# Patient Record
Sex: MALE | Race: OTHER | Employment: FULL TIME | URBAN - METROPOLITAN AREA
[De-identification: names, ages, dates, MRNs, and addresses within clinical notes are randomized per-mention and may not be internally consistent; named-entity substitution may affect disease eponyms.]

---

## 2018-03-19 ENCOUNTER — HOSPITAL ENCOUNTER (OUTPATIENT)
Age: 43
Discharge: HOME | End: 2018-03-19
Attending: FAMILY MEDICINE
Payer: COMMERCIAL

## 2018-03-19 VITALS
DIASTOLIC BLOOD PRESSURE: 98 MMHG | RESPIRATION RATE: 16 BRPM | TEMPERATURE: 98 F | HEART RATE: 62 BPM | SYSTOLIC BLOOD PRESSURE: 130 MMHG | OXYGEN SATURATION: 98 %

## 2018-03-19 DIAGNOSIS — J02.9 ACUTE VIRAL PHARYNGITIS: Primary | ICD-10-CM

## 2018-03-19 LAB — S PYO AG THROAT QL: NEGATIVE

## 2018-03-19 PROCEDURE — 87430 STREP A AG IA: CPT

## 2018-03-19 PROCEDURE — 99202 OFFICE O/P NEW SF 15 MIN: CPT

## 2018-03-19 PROCEDURE — 99203 OFFICE O/P NEW LOW 30 MIN: CPT

## 2018-03-19 RX ORDER — IBUPROFEN 800 MG/1
800 TABLET ORAL EVERY 8 HOURS PRN
Qty: 15 TABLET | Refills: 0 | Status: SHIPPED | OUTPATIENT
Start: 2018-03-19 | End: 2018-03-24

## 2020-03-16 ENCOUNTER — OFFICE VISIT (OUTPATIENT)
Dept: INTERNAL MEDICINE CLINIC | Facility: CLINIC | Age: 45
End: 2020-03-16
Payer: COMMERCIAL

## 2020-03-16 VITALS
BODY MASS INDEX: 30.99 KG/M2 | DIASTOLIC BLOOD PRESSURE: 105 MMHG | HEART RATE: 70 BPM | TEMPERATURE: 97 F | SYSTOLIC BLOOD PRESSURE: 160 MMHG | WEIGHT: 186 LBS | HEIGHT: 65 IN

## 2020-03-16 DIAGNOSIS — M62.838 MUSCLE SPASM OF LEFT SHOULDER: ICD-10-CM

## 2020-03-16 DIAGNOSIS — M25.512 ACUTE PAIN OF LEFT SHOULDER: Primary | ICD-10-CM

## 2020-03-16 PROCEDURE — 99214 OFFICE O/P EST MOD 30 MIN: CPT | Performed by: INTERNAL MEDICINE

## 2020-03-16 RX ORDER — IBUPROFEN 200 MG
200 TABLET ORAL EVERY 6 HOURS PRN
COMMUNITY

## 2020-03-16 RX ORDER — CYCLOBENZAPRINE HCL 5 MG
TABLET ORAL
Qty: 30 TABLET | Refills: 0 | Status: SHIPPED | OUTPATIENT
Start: 2020-03-16

## 2020-03-16 RX ORDER — METHYLPREDNISOLONE 4 MG/1
TABLET ORAL
Qty: 1 PACKAGE | Refills: 0 | Status: SHIPPED | OUTPATIENT
Start: 2020-03-16

## 2020-03-16 NOTE — PATIENT INSTRUCTIONS
Back Spasm (No Trauma)    Spasm of the back muscles can occur after a sudden forceful twisting or bending such as in a car accident. A spasm can also happen after a simple awkward movement, or after lifting something heavy with poor body positioning.  In · You can start with ice, then switch to heat. Heat from a hot shower, hot bath, or heating pad reduces pain and works well for muscle spasms. Put heat on the painful area for 20 minutes, then remove it for 20 minutes.  Do this over a period of 60 to 90 min If X-rays were taken, they may be reviewed by a radiologist. Parminder Flannery will be told of any new findings that may affect your care.   Call   Call if any of these occur:  · Trouble breathing  · Confusion  · Drowsiness or trouble awakening  · Fainting or loss of con

## 2020-03-16 NOTE — PROGRESS NOTES
History of Present Illness   Patient ID: Iliana Church is a 38year old male. Chief Complaint: Shoulder Pain (Left )      Shoulder Pain    The pain is present in the left shoulder (left anterior). This is a new problem.  Episode onset: last monday- no Abdominal: Soft. Bowel sounds are normal. Musculoskeletal:      Left shoulder: He exhibits decreased range of motion (above arm), tenderness (mild- anterior), pain and spasm.  He exhibits no bony tenderness, no swelling, no effusion, no crepitus, no deformi • cyclobenzaprine 5 MG Oral Tab Take 2.5mg - 5mg nightly as needed for muscle spasms. May cause sedation; no driving. 30 tablet 0            Assessment & Plan    1. Acute pain of left shoulder  - methylPREDNISolone 4 MG Oral Tablet Therapy Pack;  Take as Michelle Storm You don't always need X-rays for the first assessment of back pain, unless you had a physical injury such as from a car accident or fall.  If your pain continues and doesn't respond to medical treatment, X-rays and other tests may then be done.   Home care · Relax and repeat the exercise with your right knee. · Do 2 to 3 of these exercises for each leg. · Repeat, hugging both knees to your chest at the same time. · Don't bounce, but use a gentle pull.   Medicines  Talk with your doctor before using medicin © 2306-6237 [address]. [address] [facility], [facility] [employer]. All rights reserved. This information is not intended as a substitute for professional medical care. Always follow your healthcare professional's instructions.

## 2020-11-04 ENCOUNTER — TELEMEDICINE (OUTPATIENT)
Dept: INTERNAL MEDICINE CLINIC | Facility: CLINIC | Age: 45
End: 2020-11-04
Payer: COMMERCIAL

## 2020-11-04 DIAGNOSIS — R05.9 COUGH: ICD-10-CM

## 2020-11-04 DIAGNOSIS — Z20.822 SUSPECTED COVID-19 VIRUS INFECTION: Primary | ICD-10-CM

## 2020-11-04 PROCEDURE — 99213 OFFICE O/P EST LOW 20 MIN: CPT | Performed by: INTERNAL MEDICINE

## 2020-11-04 RX ORDER — CODEINE PHOSPHATE AND GUAIFENESIN 10; 100 MG/5ML; MG/5ML
5 SOLUTION ORAL EVERY 6 HOURS PRN
Qty: 240 ML | Refills: 0 | Status: SHIPPED | OUTPATIENT
Start: 2020-11-04

## 2020-11-04 NOTE — PROGRESS NOTES
Telehealth outside of [address] Verbal Consent     I conducted a telehealth visit with Lopez Peace today, 11/04/20, which was completed using two-way, real-time interactive audio and video communication.  This has been done in good arlen to headaches and a sore throat. Pertinent negatives include no abdominal pain, chest pain or diarrhea. He has tried sleep for the symptoms. The treatment provided no relief. No recent sick contacts but last week daughter felt flu like ssx for 2-3 days.    He symptoms of hypoxia pneumonia discussed advised to go to the ER if these occur. Isolate any when he comes in contact with no that he is feeling symptoms.   Cheratussin given as above to help reduce cough and help him sleep, side effects of opiate-based med as needed for Pain. • methylPREDNISolone 4 MG Oral Tablet Therapy Pack Take as directed. 1 Package 0   • cyclobenzaprine 5 MG Oral Tab Take 2.5mg - 5mg nightly as needed for muscle spasms. May cause sedation; no driving.  30 tablet 0            Patient

## 2020-11-05 ENCOUNTER — LAB ENCOUNTER (OUTPATIENT)
Dept: LAB | Age: 45
End: 2020-11-05
Attending: INTERNAL MEDICINE
Payer: COMMERCIAL

## 2020-11-05 DIAGNOSIS — Z20.822 SUSPECTED COVID-19 VIRUS INFECTION: ICD-10-CM

## 2020-11-16 ENCOUNTER — TELEMEDICINE (OUTPATIENT)
Dept: INTERNAL MEDICINE CLINIC | Facility: CLINIC | Age: 45
End: 2020-11-16
Payer: COMMERCIAL

## 2020-11-16 DIAGNOSIS — J98.8 RESPIRATORY TRACT INFECTION DUE TO COVID-19 VIRUS: Primary | ICD-10-CM

## 2020-11-16 DIAGNOSIS — U07.1 RESPIRATORY TRACT INFECTION DUE TO COVID-19 VIRUS: Primary | ICD-10-CM

## 2020-11-16 PROBLEM — Z20.822 SUSPECTED COVID-19 VIRUS INFECTION: Status: RESOLVED | Noted: 2020-11-04 | Resolved: 2020-11-16

## 2020-11-16 PROCEDURE — 99213 OFFICE O/P EST LOW 20 MIN: CPT | Performed by: INTERNAL MEDICINE

## 2020-11-16 NOTE — PROGRESS NOTES
Telehealth outside of [address] Verbal Consent     I conducted a telehealth visit with Toña Bowens today, 11/16/20, which was completed using two-way, real-time interactive audio and video communication.  This has been done in good arlen to There has been no fever. Associated symptoms include coughing (rare, dry) and rhinorrhea. Pertinent negatives include no abdominal pain, chest pain, congestion, diarrhea, headaches or wheezing. He has tried nothing for the symptoms.  The treatment provided worsening let me know and we will reevaluate. Duration of visit in minutes: 9  Follow Up: As needed. Labs & Imaging  Pertinent labs and imaging reviewed.    No results found for: GLU, BUN, BUNCREA, CREATSERUM, ANIONGAP, GFR, GFRNAA, GFRAA, CA, OS follow directions and advice. Adelina Carranza MD  Internal Medicine    ----------------------------------------- PATIENT INSTRUCTIONS-----------------------------------------     There are no Patient Instructions on file for this visit.

## 2023-02-18 ENCOUNTER — HOSPITAL ENCOUNTER (OUTPATIENT)
Dept: CT IMAGING | Facility: HOSPITAL | Age: 48
Discharge: HOME | End: 2023-02-18
Attending: INTERNAL MEDICINE
Payer: COMMERCIAL

## 2023-02-18 ENCOUNTER — LAB ENCOUNTER (OUTPATIENT)
Dept: LAB | Age: 48
End: 2023-02-18
Attending: INTERNAL MEDICINE
Payer: COMMERCIAL

## 2023-02-18 ENCOUNTER — TELEPHONE (OUTPATIENT)
Dept: INTERNAL MEDICINE CLINIC | Facility: CLINIC | Age: 48
End: 2023-02-18

## 2023-02-18 ENCOUNTER — OFFICE VISIT (OUTPATIENT)
Dept: INTERNAL MEDICINE CLINIC | Facility: CLINIC | Age: 48
End: 2023-02-18

## 2023-02-18 VITALS
DIASTOLIC BLOOD PRESSURE: 74 MMHG | BODY MASS INDEX: 31.95 KG/M2 | OXYGEN SATURATION: 97 % | TEMPERATURE: 98 F | WEIGHT: 187.13 LBS | HEIGHT: 64 IN | SYSTOLIC BLOOD PRESSURE: 126 MMHG | HEART RATE: 71 BPM

## 2023-02-18 DIAGNOSIS — R79.89 ELEVATED LFTS: Primary | ICD-10-CM

## 2023-02-18 DIAGNOSIS — R10.12 LEFT UPPER QUADRANT ABDOMINAL PAIN: Primary | ICD-10-CM

## 2023-02-18 DIAGNOSIS — R10.12 LEFT UPPER QUADRANT ABDOMINAL PAIN: ICD-10-CM

## 2023-02-18 LAB
ALBUMIN SERPL-MCNC: 3.8 G/DL (ref 3.4–5)
ALBUMIN/GLOB SERPL: 1 {RATIO} (ref 1–2)
ALP LIVER SERPL-CCNC: 98 U/L
ALT SERPL-CCNC: 61 U/L
ANION GAP SERPL CALC-SCNC: 3 MMOL/L (ref 0–18)
APPEARANCE: YELLOW
AST SERPL-CCNC: 41 U/L (ref 15–37)
BASOPHILS # BLD AUTO: 0.03 X10(3) UL (ref 0–0.2)
BASOPHILS NFR BLD AUTO: 0.5 %
BILIRUB SERPL-MCNC: 0.4 MG/DL (ref 0.1–2)
BILIRUBIN: NEGATIVE
BUN BLD-MCNC: 12 MG/DL (ref 7–18)
BUN/CREAT SERPL: 10.5 (ref 10–20)
CALCIUM BLD-MCNC: 8.9 MG/DL (ref 8.5–10.1)
CHLORIDE SERPL-SCNC: 110 MMOL/L (ref 98–112)
CO2 SERPL-SCNC: 31 MMOL/L (ref 21–32)
CREAT BLD-MCNC: 1.14 MG/DL
DEPRECATED RDW RBC AUTO: 37.2 FL (ref 35.1–46.3)
EOSINOPHIL # BLD AUTO: 0.13 X10(3) UL (ref 0–0.7)
EOSINOPHIL NFR BLD AUTO: 2 %
ERYTHROCYTE [DISTWIDTH] IN BLOOD BY AUTOMATED COUNT: 12.2 % (ref 11–15)
FASTING STATUS PATIENT QL REPORTED: YES
GFR SERPLBLD BASED ON 1.73 SQ M-ARVRAT: 80 ML/MIN/1.73M2 (ref 60–?)
GLOBULIN PLAS-MCNC: 3.9 G/DL (ref 2.8–4.4)
GLUCOSE (URINE DIPSTICK): NEGATIVE MG/DL
GLUCOSE BLD-MCNC: 94 MG/DL (ref 70–99)
HCT VFR BLD AUTO: 42 %
HGB BLD-MCNC: 14 G/DL
IMM GRANULOCYTES # BLD AUTO: 0.01 X10(3) UL (ref 0–1)
IMM GRANULOCYTES NFR BLD: 0.2 %
KETONES (URINE DIPSTICK): NEGATIVE MG/DL
LEUKOCYTES: NEGATIVE
LIPASE SERPL-CCNC: 145 U/L (ref 73–393)
LIPASE SERPL-CCNC: 32 U/L (ref 13–75)
LYMPHOCYTES # BLD AUTO: 2.65 X10(3) UL (ref 1–4)
LYMPHOCYTES NFR BLD AUTO: 40.1 %
MCH RBC QN AUTO: 27.6 PG (ref 26–34)
MCHC RBC AUTO-ENTMCNC: 33.3 G/DL (ref 31–37)
MCV RBC AUTO: 82.7 FL
MONOCYTES # BLD AUTO: 0.43 X10(3) UL (ref 0.1–1)
MONOCYTES NFR BLD AUTO: 6.5 %
MULTISTIX LOT#: ABNORMAL NUMERIC
NEUTROPHILS # BLD AUTO: 3.36 X10 (3) UL (ref 1.5–7.7)
NEUTROPHILS # BLD AUTO: 3.36 X10(3) UL (ref 1.5–7.7)
NEUTROPHILS NFR BLD AUTO: 50.7 %
NITRITE, URINE: NEGATIVE
OCCULT BLOOD: NEGATIVE
OSMOLALITY SERPL CALC.SUM OF ELEC: 298 MOSM/KG (ref 275–295)
PH, URINE: 6.5 (ref 4.5–8)
PLATELET # BLD AUTO: 385 10(3)UL (ref 150–450)
POTASSIUM SERPL-SCNC: 4.3 MMOL/L (ref 3.5–5.1)
PROT SERPL-MCNC: 7.7 G/DL (ref 6.4–8.2)
PROTEIN (URINE DIPSTICK): NEGATIVE MG/DL
RBC # BLD AUTO: 5.08 X10(6)UL
SODIUM SERPL-SCNC: 144 MMOL/L (ref 136–145)
SPECIFIC GRAVITY: 1.02 (ref 1–1.03)
URINE-COLOR: YELLOW
UROBILINOGEN,SEMI-QN: 0.2 MG/DL (ref 0–1.9)
WBC # BLD AUTO: 6.6 X10(3) UL (ref 4–11)

## 2023-02-18 PROCEDURE — 83690 ASSAY OF LIPASE: CPT

## 2023-02-18 PROCEDURE — 3008F BODY MASS INDEX DOCD: CPT | Performed by: INTERNAL MEDICINE

## 2023-02-18 PROCEDURE — 80053 COMPREHEN METABOLIC PANEL: CPT

## 2023-02-18 PROCEDURE — 85025 COMPLETE CBC W/AUTO DIFF WBC: CPT

## 2023-02-18 PROCEDURE — 3078F DIAST BP <80 MM HG: CPT | Performed by: INTERNAL MEDICINE

## 2023-02-18 PROCEDURE — 81002 URINALYSIS NONAUTO W/O SCOPE: CPT | Performed by: INTERNAL MEDICINE

## 2023-02-18 PROCEDURE — 99214 OFFICE O/P EST MOD 30 MIN: CPT | Performed by: INTERNAL MEDICINE

## 2023-02-18 PROCEDURE — 36415 COLL VENOUS BLD VENIPUNCTURE: CPT

## 2023-02-18 PROCEDURE — 3074F SYST BP LT 130 MM HG: CPT | Performed by: INTERNAL MEDICINE

## 2023-02-18 PROCEDURE — 74177 CT ABD & PELVIS W/CONTRAST: CPT | Performed by: INTERNAL MEDICINE

## 2023-02-18 NOTE — TELEPHONE ENCOUNTER
Received a call from [facility] from [address] [place] [address] for resulted stat labs. No critical noted.     Please advise

## 2023-02-18 NOTE — TELEPHONE ENCOUNTER
Called pt but not available. Left message on voice mail that ct scan abd showed no signs of inflammation of the bowels and was fine overall. His labs were also fine except only mildly elevated liver enzyme which I dont think is cause of abdominal pain. ffup in clinic next week if the pain doesn't resolve completelty (pt had told me during his ov that the pain had been gradually getting better). Repeat lft also. Order in epic.

## 2024-11-12 ENCOUNTER — OFFICE VISIT (OUTPATIENT)
Dept: PHYSICAL MEDICINE AND REHAB | Facility: CLINIC | Age: 49
End: 2024-11-12
Payer: COMMERCIAL

## 2024-11-12 ENCOUNTER — HOSPITAL ENCOUNTER (OUTPATIENT)
Dept: GENERAL RADIOLOGY | Age: 49
Discharge: HOME | End: 2024-11-12
Attending: PHYSICAL MEDICINE & REHABILITATION
Payer: COMMERCIAL

## 2024-11-12 VITALS — BODY MASS INDEX: 32 KG/M2 | WEIGHT: 187 LBS

## 2024-11-12 DIAGNOSIS — M23.51 CHRONIC INSTABILITY OF RIGHT KNEE INVOLVING POSTERIOR HORN OF LATERAL MENISCUS: ICD-10-CM

## 2024-11-12 DIAGNOSIS — M22.2X9 PATELLOFEMORAL PAIN SYNDROME, UNSPECIFIED LATERALITY: Primary | ICD-10-CM

## 2024-11-12 DIAGNOSIS — M17.10 PRIMARY OSTEOARTHRITIS OF KNEE, UNSPECIFIED LATERALITY: ICD-10-CM

## 2024-11-12 DIAGNOSIS — M22.2X9 PATELLOFEMORAL PAIN SYNDROME, UNSPECIFIED LATERALITY: ICD-10-CM

## 2024-11-12 PROCEDURE — 99204 OFFICE O/P NEW MOD 45 MIN: CPT | Performed by: PHYSICAL MEDICINE & REHABILITATION

## 2024-11-12 PROCEDURE — 73564 X-RAY EXAM KNEE 4 OR MORE: CPT | Performed by: PHYSICAL MEDICINE & REHABILITATION

## 2024-11-12 RX ORDER — DICLOFENAC SODIUM 75 MG/1
75 TABLET, DELAYED RELEASE ORAL 2 TIMES DAILY
Qty: 60 TABLET | Refills: 1 | Status: SHIPPED | OUTPATIENT
Start: 2024-11-12

## 2024-11-12 NOTE — PATIENT INSTRUCTIONS
1) Please get X-rays of the BILATERAL knee today on your way out.   2) Please begin physical therapy as soon as possible.   3) Take Diclofenac 75 mg 1 tablet twice per day with food for the next two weeks and then as needed but no more than 2 tablets per day. Do not take with any other NSAIDS (Ibuprofen, Advil, Aleve, Naprosyn etc). OK to take Tylenol 500 mg every 6 hours as needed for pain. If you develop any side effects including stomach aches, nausea, vomiting, or other gastrointestinal symptoms, stop the medication and call my office.   4) Follow-up with me in 6 to 8 weeks after you  begin physical therapy. If symptoms do not improve, then I would recommend an MRI of the RIGHT knee or possibly knee injection

## 2024-11-12 NOTE — H&P
[facility]  Clinic H&P    Requesting Physician: Umair Gunn MD    Chief Complaint (Reason for Visit):    Chief Complaint   Patient presents with    New Patient     New right handed patient here for bilateral knee pain that radiates to calf.Pain 6/10. No T/N. Started with knee pain 2 months ago  for 18 years. Meniscus right knee surgery 22 years ago.        History of Present Illness:  The patient is a 49 year old right-handed male with no significant past medical history who presents with right lateral knee pain and left medial knee pain that has been ongoing for the last couple of months without an inciting event.  He rates his pain currently a 4-5 out of 10 but can be as high as a 6-7 out of 10.  His symptoms are aggravated when sitting for a long period of time and playing sports such as soccer.  His symptoms improved with moving around and changing positions as well as doing exercises.  He has not had x-rays of the knees.  No physical therapy has been performed.  He has been taking ibuprofen as needed for pain which is mildly helpful.  He denies any recent injections.  He denies any paresthesias or weakness.  He will feel occasional locking of the right knee in the morning and feels relief when he unlocks it or clicks at out.  He does have some clicking and popping in the knee.  He does have a history of a meniscal surgery several years ago in the right knee.  He currently works as a .    PAST MEDICAL HISTORY:   Past Medical History:    Kidney stone    2013       PAST SURGICAL HISTORY:   History reviewed. No pertinent surgical history.     FAMILY HISTORY:   Family History   Problem Relation Age of Onset    Hypertension Father     Hypertension Mother     No Known Problems Sister     No Known Problems Brother        SOCIAL HISTORY:   Social History     Occupational History    Not on file   Tobacco Use    Smoking status: Never      Passive exposure: Never    Smokeless tobacco: Never   Vaping Use    Vaping status: Never Used   Substance and Sexual Activity    Alcohol use: Yes     Comment: occasionally    Drug use: Never    Sexual activity: Not on file       CURRENT MEDICATIONS:   Current Outpatient Medications   Medication Sig Dispense Refill    diclofenac 75 MG Oral Tab EC Take 1 tablet (75 mg total) by mouth 2 (two) times daily. Take with food for 2 weeks as directed and then as needed. 60 tablet 1    ibuprofen 200 MG Oral Tab Take 1 tablet (200 mg total) by mouth every 6 (six) hours as needed for Pain.          ALLERGIES:   Allergies[1]      REVIEW OF SYSTEMS:   Review of Systems   Constitutional: Negative.    HENT: Negative.    Eyes: Negative.    Respiratory: Negative.    Cardiovascular: Negative.    Gastrointestinal: Negative.    Genitourinary: Negative.    Musculoskeletal: As per HPI   Skin: Negative.    Neurological: As per HPI  Endo/Heme/Allergies: Negative.    Psychiatric/Behavioral: Negative.      All other systems reviewed and are negative. Pertinent positives and negatives noted in the HPI.        PHYSICAL EXAM:   Wt 187 lb (84.8 kg)   BMI 32.10 kg/m²     Body mass index is 32.1 kg/m².      General: No immediate distress  Head: Normocephalic/ Atraumatic  Eyes: Extra-occular movements intact.   Ears: No auricular hematoma or deformities  Mouth: No lesions or ulcerations  Heart: peripheral pulses intact. Normal capillary refill.   Lungs: Non-labored respirations  Abdomen: No abdominal guarding  Extremities: No lower extremity edema bilaterally   Skin: No lesions noted.   Cognition: alert & oriented x 3, attentive, able to follow 2 step commands, comprehention intact, spontaneous speech intact  Motor:    Musculoskeletal:    KNEE:  Inspection: no erythema, swelling, or obvious deformity  Palpation: Tender to palpation over the right lateral and left medial joint line as well as right lateral patellar facet.  ROM: Full active ROM in  flexion and extension  Strength: 5/5 in all myotomes of the BILATERAL lower extremities   Sensation: Intact to light touch in all dermatomes of the lower extremities  Reflexes: 2/4 at L4 and S1  Antoine Test: negative for pain over patella  Lachmans: negative for instability  Anterior / Posterior drawer: negative for instability  Valgus/Varus stress test: negative for instablity  Reji Test: Positive on the right medial joint line      Gait:  Normal    Data  No visits with results within 6 Month(s) from this visit.   Latest known visit with results is:   [facility] Lab Encounter on 02/18/2023   Component Date Value Ref Range Status    Glucose 02/18/2023 94  70 - 99 mg/dL Final    Sodium 02/18/2023 144  136 - 145 mmol/L Final    Potassium 02/18/2023 4.3  3.5 - 5.1 mmol/L Final    Chloride 02/18/2023 110  98 - 112 mmol/L Final    CO2 02/18/2023 31.0  21.0 - 32.0 mmol/L Final    Anion Gap 02/18/2023 3  0 - 18 mmol/L Final    BUN 02/18/2023 12  7 - 18 mg/dL Final    Creatinine 02/18/2023 1.14  0.70 - 1.30 mg/dL Final    BUN/CREA Ratio 02/18/2023 10.5  10.0 - 20.0 Final    Calcium, Total 02/18/2023 8.9  8.5 - 10.1 mg/dL Final    Calculated Osmolality 02/18/2023 298 (H)  275 - 295 mOsm/kg Final    eGFR-Cr 02/18/2023 80  >=60 mL/min/1.73m2 Final    ALT 02/18/2023 61  16 - 61 U/L Final    AST 02/18/2023 41 (H)  15 - 37 U/L Final    Alkaline Phosphatase 02/18/2023 98  45 - 117 U/L Final    Bilirubin, Total 02/18/2023 0.4  0.1 - 2.0 mg/dL Final    Total Protein 02/18/2023 7.7  6.4 - 8.2 g/dL Final    Albumin 02/18/2023 3.8  3.4 - 5.0 g/dL Final    Globulin  02/18/2023 3.9  2.8 - 4.4 g/dL Final    A/G Ratio 02/18/2023 1.0  1.0 - 2.0 Final    Patient Fasting for CMP? 02/18/2023 Yes   Final    Lipase 02/18/2023 145  73 - 393 U/L Final    Lipase 02/18/2023 32  13 - 75 U/L Final    WBC 02/18/2023 6.6  4.0 - 11.0 x10(3) uL Final    RBC 02/18/2023 5.08  4.30 - 5.70 x10(6)uL Final    HGB 02/18/2023 14.0  13.0 - 17.5 g/dL Final    HCT  02/18/2023 42.0  39.0 - 53.0 % Final    MCV 02/18/2023 82.7  80.0 - 100.0 fL Final    MCH 02/18/2023 27.6  26.0 - 34.0 pg Final    MCHC 02/18/2023 33.3  31.0 - 37.0 g/dL Final    RDW-SD 02/18/2023 37.2  35.1 - 46.3 fL Final    RDW 02/18/2023 12.2  11.0 - 15.0 % Final    PLT 02/18/2023 385.0  150.0 - 450.0 10(3)uL Final    Neutrophil Absolute Prelim 02/18/2023 3.36  1.50 - 7.70 x10 (3) uL Final    Neutrophil Absolute 02/18/2023 3.36  1.50 - 7.70 x10(3) uL Final    Lymphocyte Absolute 02/18/2023 2.65  1.00 - 4.00 x10(3) uL Final    Monocyte Absolute 02/18/2023 0.43  0.10 - 1.00 x10(3) uL Final    Eosinophil Absolute 02/18/2023 0.13  0.00 - 0.70 x10(3) uL Final    Basophil Absolute 02/18/2023 0.03  0.00 - 0.20 x10(3) uL Final    Immature Granulocyte Absolute 02/18/2023 0.01  0.00 - 1.00 x10(3) uL Final    Neutrophil % 02/18/2023 50.7  % Final    Lymphocyte % 02/18/2023 40.1  % Final    Monocyte % 02/18/2023 6.5  % Final    Eosinophil % 02/18/2023 2.0  % Final    Basophil % 02/18/2023 0.5  % Final    Immature Granulocyte % 02/18/2023 0.2  % Final   ]      Radiology Imaging:  NA  ASSESSMENT AND PLAN:  Edi is a pleasant 49-year-old male who presents with right lateral and left medial knee pain which I believe is due to osteoarthritis, patellofemoral syndrome, and meniscal injury.  I am recommending x-rays of the bilateral knees.  I am also recommending formal physical therapy.  He should use diclofenac twice per day and follow-up with me in about 2 months.  If his symptoms persist, we can consider hyaluronic acid and corticosteroid injections versus an MRI of the neck.       RTC in 2 months    Discharge Instructions were provided as documented in AVS summary.  The patient was in agreement with the assessment and plan.  All questions were answered.  There were no barriers to learning.         1. Patellofemoral pain syndrome, unspecified laterality    2. Primary osteoarthritis of knee, unspecified laterality    3.  Chronic instability of right knee involving posterior horn of lateral meniscus        Alex B. Behar MD, [facility]  Physical Medicine and Rehabilitation/Sports Medicine  [facility]             [1] No Known Allergies

## 2024-12-19 ENCOUNTER — OFFICE VISIT (OUTPATIENT)
Dept: PHYSICAL MEDICINE AND REHAB | Facility: CLINIC | Age: 49
End: 2024-12-19
Payer: COMMERCIAL

## 2024-12-19 ENCOUNTER — TELEPHONE (OUTPATIENT)
Dept: PHYSICAL MEDICINE AND REHAB | Facility: CLINIC | Age: 49
End: 2024-12-19

## 2024-12-19 VITALS — BODY MASS INDEX: 31.92 KG/M2 | HEIGHT: 64 IN | WEIGHT: 187 LBS

## 2024-12-19 DIAGNOSIS — M23.312 INTERNAL DERANGEMENT OF LEFT KNEE INVOLVING ANTERIOR HORN OF MEDIAL MENISCUS: ICD-10-CM

## 2024-12-19 DIAGNOSIS — M22.2X9 PATELLOFEMORAL PAIN SYNDROME, UNSPECIFIED LATERALITY: Primary | ICD-10-CM

## 2024-12-19 DIAGNOSIS — M23.51 CHRONIC INSTABILITY OF RIGHT KNEE INVOLVING POSTERIOR HORN OF LATERAL MENISCUS: ICD-10-CM

## 2024-12-19 DIAGNOSIS — M17.10 PRIMARY OSTEOARTHRITIS OF KNEE, UNSPECIFIED LATERALITY: ICD-10-CM

## 2024-12-19 NOTE — PATIENT INSTRUCTIONS
1) My office will call you to schedule the BILATERAL knee CSI once the procedure is approved by your insurance carrier.    2) Tylenol 500-1000 mg every 6-8 hours as needed for pain.  No more than 3000 mg daily.  3) Please call and schedule your MRI of the LEFT knee at [phone].  Once you have your MRI scheduled, then call my office again to schedule a follow-up visit soon after your MRI so we may review the images together.  4) Follow up with me to review the MRI images and discuss next steps       Post Injection Instructions     Please do not do anything strenuous over the next two days (if you had a knee injection do not walk more than 2 city blocks, do not attend any aerobic classes, do not run, no heavy lifting, no prolong standing).  You may resume your day to day activities after your injection.  You may experience some mild amount of swelling after the procedure.  Please ice your joint that was injected at least 5-6 times a day (15 minutes) for two days after (this will help prevent worsening pain that sometimes occurs after an injection).  Only take tylenol if needed for pain for the first few days.  Watch for signs of infection which include redness, warmth, worsening pain, fevers or chills.  If you develop any of these signs call the office immediately at [phone]    Everyone responds differently to injections, but you can expect your peak effects a few weeks after your last injection.  Alex B. Behar MD  Physical Medicine and Rehabilitation/Sports Medicine  [facility]

## 2024-12-20 NOTE — PROCEDURES
[facility]   Knee Joint Injection Procedure Note    CHIEF COMPLAINT:    Chief Complaint   Patient presents with    Knee Pain     LOV: 11/12/24 C/o bilateral knee pain, localized. Consult for PT completed. XR completed. Takes Diclofenac. Rates pain constant 7/10.       PROCEDURE PERFORMED:  Bilateral knee intra-articular corticosteroid injection under ultrasound guidance    INDICATIONS:  Bilateral knee pain and osteoarthritis    PRIMARY PROCEDURALIST:  Alex Behar, MD    INFORMED CONSENT & TIME OUT:   As documented in the Time Out and Pre-Procedure Check Lists.  Verbal consent was obtained    Vitals: [unfilled]  Labs (document last wbc, plts, hgb, and PT/INR):    No visits with results within 6 Month(s) from this visit.   Latest known visit with results is:   [facility]   Component Date Value Ref Range Status    Glucose 02/18/2023 94  70 - 99 mg/dL Final    Sodium 02/18/2023 144  136 - 145 mmol/L Final    Potassium 02/18/2023 4.3  3.5 - 5.1 mmol/L Final    Chloride 02/18/2023 110  98 - 112 mmol/L Final    CO2 02/18/2023 31.0  21.0 - 32.0 mmol/L Final    Anion Gap 02/18/2023 3  0 - 18 mmol/L Final    BUN 02/18/2023 12  7 - 18 mg/dL Final    Creatinine 02/18/2023 1.14  0.70 - 1.30 mg/dL Final    BUN/CREA Ratio 02/18/2023 10.5  10.0 - 20.0 Final    Calcium, Total 02/18/2023 8.9  8.5 - 10.1 mg/dL Final    Calculated Osmolality 02/18/2023 298 (H)  275 - 295 mOsm/kg Final    eGFR-Cr 02/18/2023 80  >=60 mL/min/1.73m2 Final    ALT 02/18/2023 61  16 - 61 U/L Final    AST 02/18/2023 41 (H)  15 - 37 U/L Final    Alkaline Phosphatase 02/18/2023 98  45 - 117 U/L Final    Bilirubin, Total 02/18/2023 0.4  0.1 - 2.0 mg/dL Final    Total Protein 02/18/2023 7.7  6.4 - 8.2 g/dL Final    Albumin 02/18/2023 3.8  3.4 - 5.0 g/dL Final    Globulin  02/18/2023 3.9  2.8 - 4.4 g/dL Final    A/G Ratio 02/18/2023 1.0  1.0 - 2.0 Final    Patient Fasting for CMP? 02/18/2023 Yes    Final    Lipase 02/18/2023 145  73 - 393 U/L Final    Lipase 02/18/2023 32  13 - 75 U/L Final    WBC 02/18/2023 6.6  4.0 - 11.0 x10(3) uL Final    RBC 02/18/2023 5.08  4.30 - 5.70 x10(6)uL Final    HGB 02/18/2023 14.0  13.0 - 17.5 g/dL Final    HCT 02/18/2023 42.0  39.0 - 53.0 % Final    MCV 02/18/2023 82.7  80.0 - 100.0 fL Final    MCH 02/18/2023 27.6  26.0 - 34.0 pg Final    MCHC 02/18/2023 33.3  31.0 - 37.0 g/dL Final    RDW-SD 02/18/2023 37.2  35.1 - 46.3 fL Final    RDW 02/18/2023 12.2  11.0 - 15.0 % Final    PLT 02/18/2023 385.0  150.0 - 450.0 10(3)uL Final    Neutrophil Absolute Prelim 02/18/2023 3.36  1.50 - 7.70 x10 (3) uL Final    Neutrophil Absolute 02/18/2023 3.36  1.50 - 7.70 x10(3) uL Final    Lymphocyte Absolute 02/18/2023 2.65  1.00 - 4.00 x10(3) uL Final    Monocyte Absolute 02/18/2023 0.43  0.10 - 1.00 x10(3) uL Final    Eosinophil Absolute 02/18/2023 0.13  0.00 - 0.70 x10(3) uL Final    Basophil Absolute 02/18/2023 0.03  0.00 - 0.20 x10(3) uL Final    Immature Granulocyte Absolute 02/18/2023 0.01  0.00 - 1.00 x10(3) uL Final    Neutrophil % 02/18/2023 50.7  % Final    Lymphocyte % 02/18/2023 40.1  % Final    Monocyte % 02/18/2023 6.5  % Final    Eosinophil % 02/18/2023 2.0  % Final    Basophil % 02/18/2023 0.5  % Final    Immature Granulocyte % 02/18/2023 0.2  % Final   ]    PROCEDURE:  The procedure, risks, benefits, and alternatives were discussed with the patient.  The patient verbalized understanding and gave verbal consent. The Bilateral knee was prepped and draped in the standard sterile fashion using 3 sticks of Betadine. Using a linear high frequency probe, the suprapatellar recess and bursa was visualized. A 18-gauge 1.5 inch needle with a 5 cc syringe containing 5 cc of 1% lidocaine was introduced through the superolateral approach and was seen entering the the joint capsule to anesthetize the skin and soft tissue. 5 cc of 1% lidocaine was used to anesthetize the skin and soft tissue on  each side. Aspiration was attempted with 15 cc of fluid aspirated from the left knee and 16 cc of fluid was aspirated out of the right knee. the syringe was switched out and a mixture of 4 cc 1% lidocaine and 1 cc of Kenalog containing 40 mg of corticosteroid was visualized being injected into the intra-articular space. The needle was then removed, hemostasis was obtained, Band-Aid was applied.  Patient tolerated the procedure well.  The patient was able to get up and ambulate.   Patient verbalized understanding of assessment and plan.  Patient is in agreement with the plan.  All questions were answered.  No barriers to learning identified. Permanent pictures were saved.     INSTRUCTIONS GIVEN TO PATIENT:    \"You will see an effect in the next 2-3 days.  Please contact me if you have fevers, worsening swelling, worsening pain, decreased range of motion, increased redness, chills, or anything that makes you concerned about how the joint we injected feels/looks.  If you do not reach me in a reasonable time, please report directly to the emergency room for further evaluation\"        Alex B. Behar MD, [facility]  Physical Medicine and Rehabilitation/Sports Medicine  [facility]

## 2024-12-20 NOTE — PROGRESS NOTES
[facility]  Progress Note    CHIEF COMPLAINT:    Chief Complaint   Patient presents with    Knee Pain     LOV: 11/12/24 C/o bilateral knee pain, localized. Consult for PT completed. XR completed. Takes Diclofenac. Rates pain constant 7/10.       History of Present Illness:  The patient is a 49 year old  right-handed male with no significant past medical history who presents with right lateral knee pain and left medial knee pain that has been ongoing for the last couple of months without an inciting event.  He rates the left knee pain a 7 out of 10 in the right knee pain a 3-4 out of 10.  He has started physical therapy.  He is also had x-rays of the bilateral knees and has been taking diclofenac.  His symptoms were aggravated over the last week with severe pain in the left medial knee.    PAST MEDICAL HISTORY:  Past Medical History:    Kidney stone    2013       SURGICAL HISTORY:  History reviewed. No pertinent surgical history.    SOCIAL HISTORY:   Social History     Occupational History    Not on file   Tobacco Use    Smoking status: Never     Passive exposure: Never    Smokeless tobacco: Never   Vaping Use    Vaping status: Never Used   Substance and Sexual Activity    Alcohol use: Yes     Comment: occasionally    Drug use: Never    Sexual activity: Not on file       FAMILY HISTORY:   Family History   Problem Relation Age of Onset    Hypertension Father     Hypertension Mother     No Known Problems Sister     No Known Problems Brother        CURRENT MEDICATIONS:   Current Outpatient Medications   Medication Sig Dispense Refill    diclofenac 75 MG Oral Tab EC Take 1 tablet (75 mg total) by mouth 2 (two) times daily. Take with food for 2 weeks as directed and then as needed. 60 tablet 1    ibuprofen 200 MG Oral Tab Take 1 tablet (200 mg total) by mouth every 6 (six) hours as needed for Pain.         ALLERGIES:   Allergies[1]    REVIEW OF SYSTEMS:   Review of Systems    Constitutional: Negative.    HENT: Negative.    Eyes: Negative.    Respiratory: Negative.    Cardiovascular: Negative.    Gastrointestinal: Negative.    Genitourinary: Negative.    Musculoskeletal: As per HPI  Skin: Negative.    Neurological: As per HPI  Endo/Heme/Allergies: Negative.    Psychiatric/Behavioral: Negative.      All other systems reviewed and are negative. Pertinent positives and negatives noted in the HPI.        PHYSICAL EXAM:   Ht 64\"   Wt 187 lb (84.8 kg)   BMI 32.10 kg/m²     Body mass index is 32.1 kg/m².      General: No immediate distress  Head: Normocephalic/ Atraumatic  Eyes: Extra-occular movements intact.   Ears: No auricular hematoma or deformities  Mouth: No lesions or ulcerations  Heart: peripheral pulses intact. Normal capillary refill.   Lungs: Non-labored respirations  Abdomen: No abdominal guarding  Extremities: No lower extremity edema bilaterally   Skin: No lesions noted.   Cognition: alert & oriented x 3, attentive, able to follow 2 step commands, comprehention intact, spontaneous speech intact  Motor:    Musculoskeletal:        Data  No visits with results within 6 Month(s) from this visit.   Latest known visit with results is:   [facility] Lab Encounter on 02/18/2023   Component Date Value Ref Range Status    Glucose 02/18/2023 94  70 - 99 mg/dL Final    Sodium 02/18/2023 144  136 - 145 mmol/L Final    Potassium 02/18/2023 4.3  3.5 - 5.1 mmol/L Final    Chloride 02/18/2023 110  98 - 112 mmol/L Final    CO2 02/18/2023 31.0  21.0 - 32.0 mmol/L Final    Anion Gap 02/18/2023 3  0 - 18 mmol/L Final    BUN 02/18/2023 12  7 - 18 mg/dL Final    Creatinine 02/18/2023 1.14  0.70 - 1.30 mg/dL Final    BUN/CREA Ratio 02/18/2023 10.5  10.0 - 20.0 Final    Calcium, Total 02/18/2023 8.9  8.5 - 10.1 mg/dL Final    Calculated Osmolality 02/18/2023 298 (H)  275 - 295 mOsm/kg Final    eGFR-Cr 02/18/2023 80  >=60 mL/min/1.73m2 Final    ALT 02/18/2023 61  16 - 61 U/L Final    AST 02/18/2023 41 (H  15  - 37 U/L Final    Alkaline Phosphatase 02/18/2023 98  45 - 117 U/L Final    Bilirubin, Total 02/18/2023 0.4  0.1 - 2.0 mg/dL Final    Total Protein 02/18/2023 7.7  6.4 - 8.2 g/dL Final    Albumin 02/18/2023 3.8  3.4 - 5.0 g/dL Final    Globulin  02/18/2023 3.9  2.8 - 4.4 g/dL Final    A/G Ratio 02/18/2023 1.0  1.0 - 2.0 Final    Patient Fasting for CMP? 02/18/2023 Yes   Final    Lipase 02/18/2023 145  73 - 393 U/L Final    Lipase 02/18/2023 32  13 - 75 U/L Final    WBC 02/18/2023 6.6  4.0 - 11.0 x10(3) uL Final    RBC 02/18/2023 5.08  4.30 - 5.70 x10(6)uL Final    HGB 02/18/2023 14.0  13.0 - 17.5 g/dL Final    HCT 02/18/2023 42.0  39.0 - 53.0 % Final    MCV 02/18/2023 82.7  80.0 - 100.0 fL Final    MCH 02/18/2023 27.6  26.0 - 34.0 pg Final    MCHC 02/18/2023 33.3  31.0 - 37.0 g/dL Final    RDW-SD 02/18/2023 37.2  35.1 - 46.3 fL Final    RDW 02/18/2023 12.2  11.0 - 15.0 % Final    PLT 02/18/2023 385.0  150.0 - 450.0 10(3)uL Final    Neutrophil Absolute Prelim 02/18/2023 3.36  1.50 - 7.70 x10 (3) uL Final    Neutrophil Absolute 02/18/2023 3.36  1.50 - 7.70 x10(3) uL Final    Lymphocyte Absolute 02/18/2023 2.65  1.00 - 4.00 x10(3) uL Final    Monocyte Absolute 02/18/2023 0.43  0.10 - 1.00 x10(3) uL Final    Eosinophil Absolute 02/18/2023 0.13  0.00 - 0.70 x10(3) uL Final    Basophil Absolute 02/18/2023 0.03  0.00 - 0.20 x10(3) uL Final    Immature Granulocyte Absolute 02/18/2023 0.01  0.00 - 1.00 x10(3) uL Final    Neutrophil % 02/18/2023 50.7  % Final    Lymphocyte % 02/18/2023 40.1  % Final    Monocyte % 02/18/2023 6.5  % Final    Eosinophil % 02/18/2023 2.0  % Final    Basophil % 02/18/2023 0.5  % Final    Immature Granulocyte % 02/18/2023 0.2  % Final   ]      Radiology Imaging:  I reviewed with the patient his X-ray of the knees bilateral from 11/13/2024  XR KNEE COMPLETE BILAT (OYP=[id number])  Narrative: PROCEDURE: XR KNEE COMPLETE BILAT EM     COMPARISON: None.     INDICATIONS: Chronic bilateral knee pain.      TECHNIQUE: 4 views were obtained.       FINDINGS:         Bone mineralization is normal.     There is no acute fracture/dislocation.     There are slight symmetric degenerative changes within both knees manifested by slight articular space narrowing, subarticular sclerosis, and tibial spine sharpening.                 Impression: CONCLUSION: Slight symmetric degenerative changes within both knees.           Dictated by (CST): Igor Noonan MD on 11/13/2024 at 10:00 AM       Finalized by (CST): Igor Noonan MD on 11/13/2024 at 10:01 AM              ASSESSMENT AND PLAN:  The patient is a pleasant 49-year-old male who presents for follow-up of his bilateral knee pain due to osteoarthritis.  He is having severe left medial joint line pain which is exquisitely tender to palpation.  I have reviewed his x-rays of the knees which demonstrate only mild degenerative changes.  I believe his symptoms may be due to a meniscal injury or chondromalacia.  I am recommending bilateral knee corticosteroid injections under ultrasound guidance which we performed today.  Please see separate procedure note.  I have also recommended an MRI of the left knee.  I have recommended he continue diclofenac and his home exercise program.  He will follow-up with me in about 2 months but sooner for the MRI review       RTC in 2 months but sooner for MRI review  Discharge Instructions were provided as documented in AVS summary.  The patient was in agreement with the assessment and plan.  All questions were answered.  There were no barriers to learning.         1. Patellofemoral pain syndrome, unspecified laterality    2. Primary osteoarthritis of knee, unspecified laterality    3. Chronic instability of right knee involving posterior horn of lateral meniscus    4. Internal derangement of left knee involving anterior horn of medial meniscus        Alex B. Behar MD  Physical Medicine and Rehabilitation/Sports Medicine  [facility]  [place]         [1] No Known Allergies

## 2024-12-20 NOTE — TELEPHONE ENCOUNTER
Initiated authorization for BILATERAL knee CSI under . CPT/HCPCS 16634-85, ( x's2) dx:M22.2X9 with [facility] portal  Completed in the office    Status: Approved - Prior Authorization/Notification is not required  Decision ID #: [id number]    Authorization is not required based on medical necessity, however, is not a guarantee of payment and may be subject to review once claim is submitted-Covered Benefit.       CPT code: J330 -    Approved - Prior Authorization/Notification is not required  Reference # [id number]

## 2024-12-23 RX ORDER — LIDOCAINE HYDROCHLORIDE 10 MG/ML
18 INJECTION, SOLUTION INFILTRATION; PERINEURAL ONCE
Status: COMPLETED | OUTPATIENT
Start: 2024-12-23 | End: 2024-12-23

## 2024-12-23 RX ORDER — TRIAMCINOLONE ACETONIDE 40 MG/ML
80 INJECTION, SUSPENSION INTRA-ARTICULAR; INTRAMUSCULAR ONCE
Status: COMPLETED | OUTPATIENT
Start: 2024-12-23 | End: 2024-12-23

## 2024-12-23 RX ADMIN — LIDOCAINE HYDROCHLORIDE 18 ML: 10 INJECTION, SOLUTION INFILTRATION; PERINEURAL at 13:35:00

## 2024-12-23 RX ADMIN — TRIAMCINOLONE ACETONIDE 80 MG: 40 INJECTION, SUSPENSION INTRA-ARTICULAR; INTRAMUSCULAR at 13:35:00

## 2024-12-27 ENCOUNTER — HOSPITAL ENCOUNTER (OUTPATIENT)
Dept: MRI IMAGING | Age: 49
Discharge: HOME | End: 2024-12-27
Attending: PHYSICAL MEDICINE & REHABILITATION
Payer: COMMERCIAL

## 2024-12-27 DIAGNOSIS — M17.10 PRIMARY OSTEOARTHRITIS OF KNEE, UNSPECIFIED LATERALITY: ICD-10-CM

## 2024-12-27 DIAGNOSIS — M23.312 INTERNAL DERANGEMENT OF LEFT KNEE INVOLVING ANTERIOR HORN OF MEDIAL MENISCUS: ICD-10-CM

## 2024-12-27 DIAGNOSIS — M22.2X9 PATELLOFEMORAL PAIN SYNDROME, UNSPECIFIED LATERALITY: ICD-10-CM

## 2024-12-27 DIAGNOSIS — M23.51 CHRONIC INSTABILITY OF RIGHT KNEE INVOLVING POSTERIOR HORN OF LATERAL MENISCUS: ICD-10-CM

## 2024-12-27 PROCEDURE — 73721 MRI JNT OF LWR EXTRE W/O DYE: CPT | Performed by: PHYSICAL MEDICINE & REHABILITATION

## 2025-01-08 ENCOUNTER — TELEMEDICINE (OUTPATIENT)
Dept: PHYSICAL MEDICINE AND REHAB | Facility: CLINIC | Age: 50
End: 2025-01-08
Payer: COMMERCIAL

## 2025-01-08 DIAGNOSIS — M23.51 CHRONIC INSTABILITY OF RIGHT KNEE INVOLVING POSTERIOR HORN OF LATERAL MENISCUS: ICD-10-CM

## 2025-01-08 DIAGNOSIS — S83.207A TEAR OF LEFT MENISCUS AS CURRENT INJURY, INITIAL ENCOUNTER: ICD-10-CM

## 2025-01-08 DIAGNOSIS — M22.2X9 PATELLOFEMORAL PAIN SYNDROME, UNSPECIFIED LATERALITY: ICD-10-CM

## 2025-01-08 DIAGNOSIS — M22.2X9 PATELLOFEMORAL PAIN SYNDROME, UNSPECIFIED LATERALITY: Primary | ICD-10-CM

## 2025-01-08 DIAGNOSIS — M23.312 INTERNAL DERANGEMENT OF LEFT KNEE INVOLVING ANTERIOR HORN OF MEDIAL MENISCUS: ICD-10-CM

## 2025-01-08 DIAGNOSIS — M17.10 PRIMARY OSTEOARTHRITIS OF KNEE, UNSPECIFIED LATERALITY: ICD-10-CM

## 2025-01-08 PROCEDURE — 98006 SYNCH AUDIO-VIDEO EST MOD 30: CPT | Performed by: PHYSICAL MEDICINE & REHABILITATION

## 2025-01-08 RX ORDER — DICLOFENAC SODIUM 75 MG/1
75 TABLET, DELAYED RELEASE ORAL 2 TIMES DAILY WITH MEALS
Qty: 60 TABLET | Refills: 0 | OUTPATIENT
Start: 2025-01-08

## 2025-01-08 NOTE — PATIENT INSTRUCTIONS
1) Please begin physical therapy as soon as possible.   2) Take Glucosamine with chondroitin 1500/1200 mg daily.   3) Start tumeric with black pepper 1000 mg twice per day  4) Stop Diclofenac  5) Start Ibuprofen 400 mg every 6 hours as needed for pain  6) Follow up with me in about 6 to 8 weeks.  If symptoms continue, then I would recommend orthopedic consultation.

## 2025-01-08 NOTE — PROGRESS NOTES
[facility]  Video Visit Progress Note  CHIEF COMPLAINT:    Chief Complaint   Patient presents with    Knee Pain    Imaging    Injection       History of Present Illness:  The patient is a 49 year old right-handed male with no significant past medical history who presents with right lateral knee pain and left medial knee pain that has been ongoing for the last couple of months without an inciting event.  He is status post bilateral knee corticosteroid injections on 12/19/2024.  He improved by 95% on the right and 20% on the left.  He had an MRI of the left knee performed which independently reviewed with him today.  He rates the left knee pain a 5 out of 10 and the right knee pain a 1-2 out of 10.  He was able to exercise yesterday but has residual pain in the left knee.    PAST MEDICAL HISTORY:  Past Medical History:    Kidney stone    2013       SURGICAL HISTORY:  History reviewed. No pertinent surgical history.    SOCIAL HISTORY:   Social History     Occupational History    Not on file   Tobacco Use    Smoking status: Never     Passive exposure: Never    Smokeless tobacco: Never   Vaping Use    Vaping status: Never Used   Substance and Sexual Activity    Alcohol use: Yes     Comment: occasionally    Drug use: Never    Sexual activity: Not on file       FAMILY HISTORY:   Family History   Problem Relation Age of Onset    Hypertension Father     Hypertension Mother     No Known Problems Sister     No Known Problems Brother        CURRENT MEDICATIONS:   Current Outpatient Medications   Medication Sig Dispense Refill    ibuprofen 200 MG Oral Tab Take 1 tablet (200 mg total) by mouth every 6 (six) hours as needed for Pain.         ALLERGIES:   Allergies[1]    REVIEW OF SYSTEMS:   Review of Systems   Constitutional: Negative.    HENT: Negative.    Eyes: Negative.    Respiratory: Negative.    Cardiovascular: Negative.    Gastrointestinal: Negative.    Genitourinary:  Negative.    Musculoskeletal: As per HPI  Skin: Negative.    Neurological: As per HPI  Endo/Heme/Allergies: Negative.    Psychiatric/Behavioral: Negative.      All other systems reviewed and are negative. Pertinent positives and negatives noted in the HPI.        PHYSICAL EXAM:     There is no height or weight on file to calculate BMI.    General: No immediate distress  Head: Normocephalic/ Atraumatic  Eyes: Extra-occular movements intact  Ears/Nose/Throat:  External appearance identifies normal appearance without obvious deformity  Cardiovascular: No cyanosis, clubbing or edema  Respiratory: Non-labored respirations  Skin: No lesions noted   Neurological: alert & oriented x 3, attentive, able to follow commands, comprehention intact, spontaneous speech intact  Psychiatric: Mood and affect appropriate        Data  No visits with results within 6 Month(s) from this visit.   Latest known visit with results is:   [facility] Lab Encounter on 02/18/2023   Component Date Value Ref Range Status    Glucose 02/18/2023 94  70 - 99 mg/dL Final    Sodium 02/18/2023 144  136 - 145 mmol/L Final    Potassium 02/18/2023 4.3  3.5 - 5.1 mmol/L Final    Chloride 02/18/2023 110  98 - 112 mmol/L Final    CO2 02/18/2023 31.0  21.0 - 32.0 mmol/L Final    Anion Gap 02/18/2023 3  0 - 18 mmol/L Final    BUN 02/18/2023 12  7 - 18 mg/dL Final    Creatinine 02/18/2023 1.14  0.70 - 1.30 mg/dL Final    BUN/CREA Ratio 02/18/2023 10.5  10.0 - 20.0 Final    Calcium, Total 02/18/2023 8.9  8.5 - 10.1 mg/dL Final    Calculated Osmolality 02/18/2023 298 (H)  275 - 295 mOsm/kg Final    eGFR-Cr 02/18/2023 80  >=60 mL/min/1.73m2 Final    ALT 02/18/2023 61  16 - 61 U/L Final    AST 02/18/2023 41 (H)  15 - 37 U/L Final    Alkaline Phosphatase 02/18/2023 98  45 - 117 U/L Final    Bilirubin, Total 02/18/2023 0.4  0.1 - 2.0 mg/dL Final    Total Protein 02/18/2023 7.7  6.4 - 8.2 g/dL Final    Albumin 02/18/2023 3.8  3.4 - 5.0 g/dL Final    Globulin  02/18/2023 3.9  2.8  - 4.4 g/dL Final    A/G Ratio 02/18/2023 1.0  1.0 - 2.0 Final    Patient Fasting for CMP? 02/18/2023 Yes   Final    Lipase 02/18/2023 145  73 - 393 U/L Final    Lipase 02/18/2023 32  13 - 75 U/L Final    WBC 02/18/2023 6.6  4.0 - 11.0 x10(3) uL Final    RBC 02/18/2023 5.08  4.30 - 5.70 x10(6)uL Final    HGB 02/18/2023 14.0  13.0 - 17.5 g/dL Final    HCT 02/18/2023 42.0  39.0 - 53.0 % Final    MCV 02/18/2023 82.7  80.0 - 100.0 fL Final    MCH 02/18/2023 27.6  26.0 - 34.0 pg Final    MCHC 02/18/2023 33.3  31.0 - 37.0 g/dL Final    RDW-SD 02/18/2023 37.2  35.1 - 46.3 fL Final    RDW 02/18/2023 12.2  11.0 - 15.0 % Final    PLT 02/18/2023 385.0  150.0 - 450.0 10(3)uL Final    Neutrophil Absolute Prelim 02/18/2023 3.36  1.50 - 7.70 x10 (3) uL Final    Neutrophil Absolute 02/18/2023 3.36  1.50 - 7.70 x10(3) uL Final    Lymphocyte Absolute 02/18/2023 2.65  1.00 - 4.00 x10(3) uL Final    Monocyte Absolute 02/18/2023 0.43  0.10 - 1.00 x10(3) uL Final    Eosinophil Absolute 02/18/2023 0.13  0.00 - 0.70 x10(3) uL Final    Basophil Absolute 02/18/2023 0.03  0.00 - 0.20 x10(3) uL Final    Immature Granulocyte Absolute 02/18/2023 0.01  0.00 - 1.00 x10(3) uL Final    Neutrophil % 02/18/2023 50.7  % Final    Lymphocyte % 02/18/2023 40.1  % Final    Monocyte % 02/18/2023 6.5  % Final    Eosinophil % 02/18/2023 2.0  % Final    Basophil % 02/18/2023 0.5  % Final    Immature Granulocyte % 02/18/2023 0.2  % Final   ]      Radiology Imaging:  I reviewed with the patient his MRI of the knees left from 12/30/2024  MRI KNEE, LEFT ([id number]=[id number])  Narrative: PROCEDURE: MRI KNEE, LEFT (SQH=[id number])     COMPARISON: [facility], XR KNEE COMPLETE BILAT EM(YZS=[id number]), 11/12/2024, 4:38 PM.     INDICATIONS: M23.312 Internal derangement of left knee involving anterior horn of medial meniscus M23.51 Chronic instability of right knee involving posterior horn of later*.  History of right knee meniscal surgery approximately 22 years ago.      TECHNIQUE: A complete multi-planar MRI was performed.       FINDINGS:   MENISCI:  MEDIAL MENISCUS: Ruffled appearance of the free edge of the medial meniscus is noted at the body series 8, image 14.  There is mild signal abnormality at the tibial surface at this location extending to the posterior horn series 9 images 5-7.  LATERAL MENISCUS: Normal.  No visible tear or significant degeneration.       LIGAMENTS:  ACL: Normal appearing ligament.    PCL: Normal appearing ligament.    LCL COMPLEX: Normal lateral collateral ligament, fascicles, lateral capsule and ligaments.    MCL COMPLEX: Normal medial collateral ligament and medial capsule.       CARTILAGE:   PATELLOFEMORAL: Mild chondral thinning and heterogeneity.  MEDIAL COMPARTMENT: Normal.  LATERAL COMPARTMENT: There is a small fissure/defect in the weight-bearing portions of the lateral femoral condyle series 8 image 17 measuring 2 mm.      EXTENSOR MECHANISM: No acute abnormality.     SYNOVIAL SPACE: Small joint effusion.  Mild synovitis.     BONES: No acute fracture.  No marrow abnormality.     OTHER: The neurovascular bundles are intact.  No Baker's cyst.  Subtle high signal in the popliteus muscle and medial aspect of the soleus is seen series 4, image 24.                     Impression: CONCLUSION:      1. Probable medial meniscal flounce at the body with probable mild complex tibial surface tear extending from the body to the posterior horn.  The lateral meniscus is intact.     2. Small chondral fissure/defect measuring 2 mm in the weight-bearing portion of the lateral femoral condyle.  Mild chondral heterogeneity in the patellofemoral compartment.     3. Small joint effusion with mild synovitis.     4. Mild popliteus and soleus muscle strains.           Dictated by (CST): Uzair Marcus MD on 12/30/2024 at 7:15 AM       Finalized by (CST): Uzair Marcus MD on 12/30/2024 at 7:44 AM              ASSESSMENT AND PLAN:  The patient is a pleasant 49-year-old  male presents for follow-up of his bilateral knee pain (left greater than right).  The left knee pain is due to a posterior medial meniscus tear.  I reviewed his MRI with him and demonstrated this to him.  I am recommending he start formal physical therapy and use glucosamine/chondroitin as well as turmeric and continue ibuprofen.  He will follow-up with me in about 6 weeks.  His right knee did very well following the corticosteroid injections back in December.  He will do physical therapy for both knees.  If his symptoms do not improve with physical therapy, then I would recommend orthopedic surgery consultation       RTC in 6 weeks  Discharge Instructions were provided as documented in AVS summary.  The patient was in agreement with the assessment and plan.  All questions were answered.  There were no barriers to learning.         1. Patellofemoral pain syndrome, unspecified laterality    2. Primary osteoarthritis of knee, unspecified laterality    3. Internal derangement of left knee involving anterior horn of medial meniscus    4. Chronic instability of right knee involving posterior horn of lateral meniscus    5. Tear of left meniscus as current injury, initial encounter        Alex B. Behar MD  Physical Medicine and Rehabilitation/Sports Medicine  [facility]         [1] No Known Allergies

## 2025-02-10 ENCOUNTER — TELEPHONE (OUTPATIENT)
Dept: PHYSICAL THERAPY | Facility: HOSPITAL | Age: 50
End: 2025-02-10

## 2025-02-11 ENCOUNTER — OFFICE VISIT (OUTPATIENT)
Dept: PHYSICAL THERAPY | Age: 50
End: 2025-02-11
Attending: PHYSICAL MEDICINE & REHABILITATION
Payer: COMMERCIAL

## 2025-02-11 DIAGNOSIS — M25.561 ACUTE PAIN OF BOTH KNEES: Primary | ICD-10-CM

## 2025-02-11 DIAGNOSIS — M25.562 ACUTE PAIN OF BOTH KNEES: Primary | ICD-10-CM

## 2025-02-11 PROCEDURE — 97530 THERAPEUTIC ACTIVITIES: CPT

## 2025-02-11 PROCEDURE — 97161 PT EVAL LOW COMPLEX 20 MIN: CPT

## 2025-02-11 PROCEDURE — 97110 THERAPEUTIC EXERCISES: CPT

## 2025-02-12 NOTE — PROGRESS NOTES
LOWER EXTREMITY EVALUATION:     Diagnosis:   David. Knee Pain Patient:  Edi Villatoro (49 year old, male)        Referring Provider: Alex Behar  Today's Date   2/11/2025    Precautions:  None   Date of Evaluation: 02/11/25  Next MD visit: 2/18/25  Date of Surgery: None.     PATIENT SUMMARY   Summary of chief complaints: He describes the pain as sharp at david. knees.  History of current condition: Bilateral knee pain since last summer. It started on the right and moved to the left.   Pain level: current 6 /10, at best 5 /10, at worst 8 /10  Description of symptoms: Sharp pains in the knees   Occupation:    Leisure activities/Hobbies: Soccer and kickboxing   Prior level of function: Ind.  Current limitations: Working out.  Pt goals: Decreasing his pain  Past medical history was reviewed with Edi.  Significant findings include: None.  Imaging/Tests: X-ray and MRI was positive for left meniscus medially.   Edi  has a past medical history of Kidney stone.  He  has no past surgical history on file.    ASSESSMENT  Edi presents to physical therapy evaluation with primary c/o He describes the pain as sharp at david. knees.. The results of the objective tests and measures show  . Functional deficits include but are not limited to Working out.. Signs and symptoms are consistent with diagnosis of David. Knee Pain. Pt and PT discussed evaluation findings, pathology, POC and HEP.  Pt voiced understanding and performs HEP correctly without reported pain. Skilled Physical Therapy is medically necessary to address the above impairments and reach functional goals.    OBJECTIVE:   Musculoskeletal  Observation: David. genu varus  Palpation: None.   Accessory Motion: David. knee hypomobility; +2 grades.     Edema: David. Knee swelling   Special Tests:Negative for all special tests.     DAVID ROM WNL and Strength (5/5) except below:  (* denotes performed with pain)  Hip   ROM MMT (-/5)    R L R L     Flex (L2)      4+ 4     Ext              Abd     4+ 4     ER             IR             ,   Knee   ROM MMT (-/5)    R L R L     Flex 130 120 4+ 4     Ext (L3) -6 -6 4+ 4       Flexibility:  LE Flexibility R L     Hip Flexor         Hamstrings Tight Tight     ITB         Piriformis         Quads Tight Tight     Gastroc-soleus Tight Tight     Neurological:  Sensation: Intact Blayne.     Balance and Functional Mobility:  Gait: pt ambulates on level ground with other (use comment)   Balance: SLS: R 25 sec,  L 15 sec  Functional Mobility:  5x sit to stand test:     TUG:       Today's Treatment and Response:   Pt education was provided on exam findings, treatment diagnosis, treatment plan, expectations, and prognosis.  Today's Treatment       2/11/2025   PT Treatment   Therapeutic Exercise Quad Set 10x10\"  Blayne. HS St. 4x30\"  S/L Abd. 20xea.   Therapeutic Activity Discussed his condition, pt expectations and prognosis.   Therapeutic Exercise Min 10   Therapeutic Activity Min 10   Evaluation Min 25   Total of Timed Procedures 20   Total of Service Based 25   Total Treatment Time 45         Patient was instructed in and issued a HEP for: Reviewed his new HEP.    Charges:  PT EVAL: Low Complexity, 3  In agreement with evaluation findings and clinical rationale, this evaluation involved LOW COMPLEXITY decision making due to no personal factors/comorbidities, 1-2 body structures involved/activity limitations, and stable symptoms as documented in the evaluation.                                                                                                                PLAN OF CARE:    Goals: (to be met in 10 visits)   Goals    None         Frequency / Duration: Patient will be seen 2x/week or a total of 10  visits over a 90 day period. Treatment will include: Gait training; Therapeutic Exercise; Manual Therapy; Neuromuscular Re-education; Self-Care Home Management; Therapeutic Activities; Home Exercise Program instruction; Electrical  stimulation (unattended)    Education or treatment limitation: Compliance   Rehab Potential: fair     LEFS Score  LEFS Score: 35 % (2/11/2025  6:42 PM)      Patient/Family/Caregiver was advised of these findings, precautions, and treatment options and has agreed to actively participate in planning and for this course of care.    Thank you for your referral. Please co-sign or sign and return this letter via fax as soon as possible to [phone]. If you have any questions, please contact me at Dept: [phone]    Sincerely,  Electronically signed by therapist: Elizabeth Hickman, PT, DPT, CSCS  Physician's certification required: Yes  I certify the need for these services furnished under this plan of treatment and while under my care.    X___________________________________________________ Date____________________    Certification From: 2/11/2025  To:5/12/2025

## 2025-02-13 ENCOUNTER — OFFICE VISIT (OUTPATIENT)
Dept: PHYSICAL THERAPY | Age: 50
End: 2025-02-13
Attending: PHYSICAL MEDICINE & REHABILITATION
Payer: COMMERCIAL

## 2025-02-13 DIAGNOSIS — M25.562 ACUTE PAIN OF BOTH KNEES: Primary | ICD-10-CM

## 2025-02-13 DIAGNOSIS — M25.561 ACUTE PAIN OF BOTH KNEES: Primary | ICD-10-CM

## 2025-02-13 PROCEDURE — 97112 NEUROMUSCULAR REEDUCATION: CPT

## 2025-02-13 PROCEDURE — 97110 THERAPEUTIC EXERCISES: CPT

## 2025-02-14 NOTE — PROGRESS NOTES
Patient: Edi Villatoro (49 year old, male) Referring Provider:  Insurance:   Diagnosis: Joseph. Knee Pain Reynaldo Behar  [employer]   Date of Surgery: None. Next MD visit:  N/A   Precautions:  None 2/18/25 Referral Information:    Date of Evaluation: Req: 0, Auth: 0, Exp:     02/11/25 POC Auth Visits:  10       Today's Date   2/13/2025    Subjective  Patient reports having continued knee pain this pm. He has been doing his HEP and felt ok after his initial eval.       Pain: 6/10     Objective  See Tx Log.          Assessment  Patient presents with bilateral le tightness during his manual stretches. The patient also presents with bilateral le weakness during his table and wb exercises. He has some right medial knee pain with deep squats.    Goals (to be met in 10 visits)   Goals    None         Plan  Plan to work on stretching, strengthening and functional act.    Treatment Last 4 Visits       2/11/2025 2/13/2025   PT Treatment   Treatment Day  2   Therapeutic Exercise Quad Set 10x10\"  Joseph. HS St. 4x30\"  S/L Abd. 20xea. Quad Set 10x10\"  Joseph. HS St. 4x30\"  S/L Abd. 20xea.  Bike 8' L5  SKC and Calf St. 4x30\"xea.  Standing Calf St. 3x30\"     Neuro Re-Ed  SLR 20xea.  LAQs 20xea.  SB HS Curls 20x  1/4 Squats 2x20  GTB Side Steps 5 Laps   Therapeutic Activity Discussed his condition, pt expectations and prognosis.    Modalities  Ice at joseph. Knees for 10' unbilled after his treatment.   Therapeutic Exercise Min 10 25   Neuro Re-Ed Min  20   Therapeutic Activity Min 10    Evaluation Min 25    Total of Timed Procedures 20 45   Total of Service Based 25 0   Total Treatment Time 45 45         HEP  Reviewed his original HEP.    Charges     2TE and 1 Neuro

## 2025-02-18 ENCOUNTER — OFFICE VISIT (OUTPATIENT)
Dept: PHYSICAL MEDICINE AND REHAB | Facility: CLINIC | Age: 50
End: 2025-02-18
Payer: COMMERCIAL

## 2025-02-18 ENCOUNTER — OFFICE VISIT (OUTPATIENT)
Dept: PHYSICAL THERAPY | Age: 50
End: 2025-02-18
Attending: PHYSICAL MEDICINE & REHABILITATION
Payer: COMMERCIAL

## 2025-02-18 ENCOUNTER — TELEPHONE (OUTPATIENT)
Dept: PHYSICAL MEDICINE AND REHAB | Facility: CLINIC | Age: 50
End: 2025-02-18

## 2025-02-18 VITALS — HEIGHT: 64 IN | BODY MASS INDEX: 31.58 KG/M2 | WEIGHT: 185 LBS

## 2025-02-18 DIAGNOSIS — S83.207A TEAR OF LEFT MENISCUS AS CURRENT INJURY, INITIAL ENCOUNTER: ICD-10-CM

## 2025-02-18 DIAGNOSIS — M23.51 CHRONIC INSTABILITY OF RIGHT KNEE INVOLVING POSTERIOR HORN OF LATERAL MENISCUS: ICD-10-CM

## 2025-02-18 DIAGNOSIS — M25.562 ACUTE PAIN OF BOTH KNEES: Primary | ICD-10-CM

## 2025-02-18 DIAGNOSIS — M17.10 PRIMARY OSTEOARTHRITIS OF KNEE, UNSPECIFIED LATERALITY: ICD-10-CM

## 2025-02-18 DIAGNOSIS — M22.2X9 PATELLOFEMORAL PAIN SYNDROME, UNSPECIFIED LATERALITY: Primary | ICD-10-CM

## 2025-02-18 DIAGNOSIS — M23.312 INTERNAL DERANGEMENT OF LEFT KNEE INVOLVING ANTERIOR HORN OF MEDIAL MENISCUS: ICD-10-CM

## 2025-02-18 DIAGNOSIS — M25.561 ACUTE PAIN OF BOTH KNEES: Primary | ICD-10-CM

## 2025-02-18 PROCEDURE — 97112 NEUROMUSCULAR REEDUCATION: CPT

## 2025-02-18 PROCEDURE — 97110 THERAPEUTIC EXERCISES: CPT

## 2025-02-18 PROCEDURE — 99214 OFFICE O/P EST MOD 30 MIN: CPT | Performed by: PHYSICAL MEDICINE & REHABILITATION

## 2025-02-18 RX ORDER — NAPROXEN 500 MG/1
500 TABLET ORAL 2 TIMES DAILY WITH MEALS
Qty: 60 TABLET | Refills: 0 | Status: SHIPPED | OUTPATIENT
Start: 2025-02-18

## 2025-02-18 NOTE — PATIENT INSTRUCTIONS
1) My office will call you to schedule the BILATERAL knee HA and CSI under US once the procedure is approved by your insurance carrier.  This can be performed after 3/19/25  2) Please call and schedule your MRI of the RIGHT knee at [phone].  Once you have your MRI scheduled, then call my office again to schedule a follow-up visit soon after your MRI so we may review the images together.  3) Take Naprosyn 500 mg 1 tablet twice per day with food for the next two weeks and then as needed but no more than 2 tablets per day. Do not take with any other NSAIDS (Ibuprofen, Advil, Aleve, etc). OK to take Tylenol 500 mg every 6 hours as needed for pain. If you develop any side effects including stomach aches, nausea, vomiting, or other gastrointestinal symptoms, stop the medication and call my office.   4) Continue working with physical therapy  5) Follow up with me for the injections and to review the MRI.

## 2025-02-18 NOTE — TELEPHONE ENCOUNTER
Initiated authorization for BILATERAL knee HA and CSI under . CPT/HCPCS 87508-21,  x's 2, ( x's 2) dx:M17.0 with [facility] portal.    Status: [facility]  Reference/Authorization # [id number]  Valid: 2/18/25-2/18/26  Authorization is not a guarantee of payment and may be subject to review once claim is submitted.         Status: CPT code 77782-57 Prior Authorization/Notification is not required  Decision ID #: [id number]  Valid: 3/19/25-6/19/25  Authorization is not required based on medical necessity, however, is not a guarantee of payment and may be subject to review once claim is submitted.

## 2025-02-18 NOTE — PROGRESS NOTES
[facility]  Progress Note    CHIEF COMPLAINT:    Chief Complaint   Patient presents with    Follow - Up     LOV 12/19/24 for Bilateral knee intra-articular corticosteroid injection. MRI L Knee 12/27/24. Pt claims little improvement as pressure, pain are still present most days. Pain 3/10 but pt feels stabbing in L knee, pressure in R knee. Denies N/T, weakness. Pt takes ibuprofen prn, move free tablets daily with some improvement. Pt is in PT with some improvement. MRI L knee 12/27/24.       History of Present Illness:  The patient is a 49 year old  right-handed male with no significant past medical history who presents with right lateral knee pain and left medial knee pain that has been ongoing for the last couple of months without an inciting event.  He is status post bilateral knee corticosteroid injections on 12/19/2024.  He initially had more improvement on the right side than the left but now he states both of his pains are about a 7 out of 10, intermittent, and nonradiating.  He had an MRI of the left knee performed which I independently reviewed.  He has completed about 2 sessions of physical therapy thus far with Samuel.  I have reviewed those notes.    PAST MEDICAL HISTORY:  Past Medical History:    Kidney stone    2013       SURGICAL HISTORY:  History reviewed. No pertinent surgical history.    SOCIAL HISTORY:   Social History     Occupational History    Not on file   Tobacco Use    Smoking status: Never     Passive exposure: Never    Smokeless tobacco: Never   Vaping Use    Vaping status: Never Used   Substance and Sexual Activity    Alcohol use: Yes     Comment: occasionally    Drug use: Never    Sexual activity: Not on file       FAMILY HISTORY:   Family History   Problem Relation Age of Onset    Hypertension Father     Hypertension Mother     No Known Problems Sister     No Known Problems Brother        CURRENT MEDICATIONS:   Current Outpatient  Medications   Medication Sig Dispense Refill    naproxen (NAPROSYN) 500 MG Oral Tab Take 1 tablet (500 mg total) by mouth 2 (two) times daily with meals. Take for 2 weeks as directed and then as needed. 60 tablet 0       ALLERGIES:   Allergies[1]    REVIEW OF SYSTEMS:   Review of Systems   Constitutional: Negative.    HENT: Negative.    Eyes: Negative.    Respiratory: Negative.    Cardiovascular: Negative.    Gastrointestinal: Negative.    Genitourinary: Negative.    Musculoskeletal: As per HPI  Skin: Negative.    Neurological: As per HPI  Endo/Heme/Allergies: Negative.    Psychiatric/Behavioral: Negative.      All other systems reviewed and are negative. Pertinent positives and negatives noted in the HPI.        PHYSICAL EXAM:   Ht 64\"   Wt 185 lb (83.9 kg)   BMI 31.76 kg/m²     Body mass index is 31.76 kg/m².      General: No immediate distress  Head: Normocephalic/ Atraumatic  Eyes: Extra-occular movements intact.   Ears: No auricular hematoma or deformities  Mouth: No lesions or ulcerations  Heart: peripheral pulses intact. Normal capillary refill.   Lungs: Non-labored respirations  Abdomen: No abdominal guarding  Extremities: No lower extremity edema bilaterally   Skin: No lesions noted.   Cognition: alert & oriented x 3, attentive, able to follow 2 step commands, comprehension intact, spontaneous speech intact  Motor:    Musculoskeletal:    Tender to palpation over the right lateral and left medial joint lines    Data  No visits with results within 6 Month(s) from this visit.   Latest known visit with results is:   [facility] Lab Encounter on 02/18/2023   Component Date Value Ref Range Status    Glucose 02/18/2023 94  70 - 99 mg/dL Final    Sodium 02/18/2023 144  136 - 145 mmol/L Final    Potassium 02/18/2023 4.3  3.5 - 5.1 mmol/L Final    Chloride 02/18/2023 110  98 - 112 mmol/L Final    CO2 02/18/2023 31.0  21.0 - 32.0 mmol/L Final    Anion Gap 02/18/2023 3  0 - 18 mmol/L Final    BUN 02/18/2023 12  7 - 18 mg/dL  Final    Creatinine 02/18/2023 1.14  0.70 - 1.30 mg/dL Final    BUN/CREA Ratio 02/18/2023 10.5  10.0 - 20.0 Final    Calcium, Total 02/18/2023 8.9  8.5 - 10.1 mg/dL Final    Calculated Osmolality 02/18/2023 298 (H)  275 - 295 mOsm/kg Final    eGFR-Cr 02/18/2023 80  >=60 mL/min/1.73m2 Final    ALT 02/18/2023 61  16 - 61 U/L Final    AST 02/18/2023 41 (H)  15 - 37 U/L Final    Alkaline Phosphatase 02/18/2023 98  45 - 117 U/L Final    Bilirubin, Total 02/18/2023 0.4  0.1 - 2.0 mg/dL Final    Total Protein 02/18/2023 7.7  6.4 - 8.2 g/dL Final    Albumin 02/18/2023 3.8  3.4 - 5.0 g/dL Final    Globulin  02/18/2023 3.9  2.8 - 4.4 g/dL Final    A/G Ratio 02/18/2023 1.0  1.0 - 2.0 Final    Patient Fasting for CMP? 02/18/2023 Yes   Final    Lipase 02/18/2023 145  73 - 393 U/L Final    Lipase 02/18/2023 32  13 - 75 U/L Final    WBC 02/18/2023 6.6  4.0 - 11.0 x10(3) uL Final    RBC 02/18/2023 5.08  4.30 - 5.70 x10(6)uL Final    HGB 02/18/2023 14.0  13.0 - 17.5 g/dL Final    HCT 02/18/2023 42.0  39.0 - 53.0 % Final    MCV 02/18/2023 82.7  80.0 - 100.0 fL Final    MCH 02/18/2023 27.6  26.0 - 34.0 pg Final    MCHC 02/18/2023 33.3  31.0 - 37.0 g/dL Final    RDW-SD 02/18/2023 37.2  35.1 - 46.3 fL Final    RDW 02/18/2023 12.2  11.0 - 15.0 % Final    PLT 02/18/2023 385.0  150.0 - 450.0 10(3)uL Final    Neutrophil Absolute Prelim 02/18/2023 3.36  1.50 - 7.70 x10 (3) uL Final    Neutrophil Absolute 02/18/2023 3.36  1.50 - 7.70 x10(3) uL Final    Lymphocyte Absolute 02/18/2023 2.65  1.00 - 4.00 x10(3) uL Final    Monocyte Absolute 02/18/2023 0.43  0.10 - 1.00 x10(3) uL Final    Eosinophil Absolute 02/18/2023 0.13  0.00 - 0.70 x10(3) uL Final    Basophil Absolute 02/18/2023 0.03  0.00 - 0.20 x10(3) uL Final    Immature Granulocyte Absolute 02/18/2023 0.01  0.00 - 1.00 x10(3) uL Final    Neutrophil % 02/18/2023 50.7  % Final    Lymphocyte % 02/18/2023 40.1  % Final    Monocyte % 02/18/2023 6.5  % Final    Eosinophil % 02/18/2023 2.0  %  Final    Basophil % 02/18/2023 0.5  % Final    Immature Granulocyte % 02/18/2023 0.2  % Final   ]      Radiology Imaging:  I reviewed with the patient his MRI of the knees left   MRI KNEE, LEFT (WPN=[id number])  Narrative: PROCEDURE: MRI KNEE, LEFT (UZG=[id number])     COMPARISON: [facility], XR KNEE COMPLETE BILAT EM(RAC=[id number]), 11/12/2024, 4:38 PM.     INDICATIONS: M23.312 Internal derangement of left knee involving anterior horn of medial meniscus M23.51 Chronic instability of right knee involving posterior horn of later*.  History of right knee meniscal surgery approximately 22 years ago.     TECHNIQUE: A complete multi-planar MRI was performed.       FINDINGS:   MENISCI:  MEDIAL MENISCUS: Ruffled appearance of the free edge of the medial meniscus is noted at the body series 8, image 14.  There is mild signal abnormality at the tibial surface at this location extending to the posterior horn series 9 images 5-7.  LATERAL MENISCUS: Normal.  No visible tear or significant degeneration.       LIGAMENTS:  ACL: Normal appearing ligament.    PCL: Normal appearing ligament.    LCL COMPLEX: Normal lateral collateral ligament, fascicles, lateral capsule and ligaments.    MCL COMPLEX: Normal medial collateral ligament and medial capsule.       CARTILAGE:   PATELLOFEMORAL: Mild chondral thinning and heterogeneity.  MEDIAL COMPARTMENT: Normal.  LATERAL COMPARTMENT: There is a small fissure/defect in the weight-bearing portions of the lateral femoral condyle series 8 image 17 measuring 2 mm.      EXTENSOR MECHANISM: No acute abnormality.     SYNOVIAL SPACE: Small joint effusion.  Mild synovitis.     BONES: No acute fracture.  No marrow abnormality.     OTHER: The neurovascular bundles are intact.  No Baker's cyst.  Subtle high signal in the popliteus muscle and medial aspect of the soleus is seen series 4, image 24.                     Impression: CONCLUSION:      1. Probable medial meniscal flounce at the body with  probable mild complex tibial surface tear extending from the body to the posterior horn.  The lateral meniscus is intact.     2. Small chondral fissure/defect measuring 2 mm in the weight-bearing portion of the lateral femoral condyle.  Mild chondral heterogeneity in the patellofemoral compartment.     3. Small joint effusion with mild synovitis.     4. Mild popliteus and soleus muscle strains.           Dictated by (CST): Uzair Marcus MD on 12/30/2024 at 7:15 AM       Finalized by (CST): Uzair Marcus MD on 12/30/2024 at 7:44 AM              ASSESSMENT AND PLAN:  Edi is a pleasant 49-year-old male who presents for follow-up of his bilateral knee pain.  Left knee pain I believe is due to meniscal derangement.  The right knee pain is also due to meniscal injury with patellofemoral syndrome.  I am recommending an MRI of the right knee.  I am also recommending he continue working with physical therapy.  We should perform bilateral knee hyaluronic acid and corticosteroid injections under ultrasound guidance to see if he has longer relief.  These can be performed anytime after March 19, 2025.  He should try Naprosyn and discontinue the diclofenac.  I will see him again for the injections.       RTC in 4 weeks for injections  Discharge Instructions were provided as documented in AVS summary.  The patient was in agreement with the assessment and plan.  All questions were answered.  There were no barriers to learning.         1. Patellofemoral pain syndrome, unspecified laterality    2. Primary osteoarthritis of knee, unspecified laterality    3. Internal derangement of left knee involving anterior horn of medial meniscus    4. Chronic instability of right knee involving posterior horn of lateral meniscus    5. Tear of left meniscus as current injury, initial encounter        Alex B. Behar MD  Physical Medicine and Rehabilitation/Sports Medicine  [facility]      [employer] Cures Act Notice to  Patient: Medical documents like this are made available to patients in the interest of transparency. However, be advised this is a medical document and it is intended as vaof-um-rxvy communication between your medical providers. This medical document may contain abbreviations, assessments, medical data, and results or other terms that are unfamiliar. Medical documents are intended to carry relevant information, facts as evident, and the clinical opinion of the practitioner. As such, this medical document may be written in language that appears blunt or direct. You are encouraged to contact your medical provider and/or [facility] if you have any questions about this medical document.        [1] No Known Allergies

## 2025-02-18 NOTE — PROGRESS NOTES
Patient: Edi Villatoro (49 year old, male) Referring Provider:  Insurance:   Diagnosis: Joseph. Knee Pain Reynaldo Behar  [employer]   Date of Surgery: None. Next MD visit:  N/A   Precautions:  None 2/18/25 Referral Information:    Date of Evaluation: Req: 0, Auth: 0, Exp:     02/11/25 POC Auth Visits:  10       Today's Date   2/18/2025    Subjective  Patient reports continued knee pain this evening. He has been doing his HEP and felt ok after his last visit.       Pain: 4/10     Objective  See Tx Log.          Assessment  Patient presents with continued le tightness during his self stretches. He also presents with le weakness during his table and wb exercises.    Goals (to be met in 10 visits)   Goals    None         Plan  Plan to work on stretching, strengthening and functional act.    Treatment Last 4 Visits       2/11/2025 2/13/2025 2/18/2025   PT Treatment   Treatment Day  2 3   Therapeutic Exercise Quad Set 10x10\"  Joseph. HS St. 4x30\"  S/L Abd. 20xea. Quad Set 10x10\"  Joseph. HS St. 4x30\"  S/L Abd. 20xea.  Bike 8' L5  SKC and Calf St. 4x30\"xea.  Standing Calf St. 3x30\"   Quad Set 10x10\"  Joseph. HS St. 4x30\"  S/L Abd. 3# 20xea.  Bike 8' L5  SKC and Calf St. 4x30\"xea.  Standing Calf St. 3x30\"     Neuro Re-Ed  SLR 20xea.  LAQs 20xea.  SB HS Curls 20x  1/4 Squats 2x20  GTB Side Steps 5 Laps SLR 3# 20xea.  LAQs 3#20xea.  SB HS Curls 20x  1/4 Squats 2x20  GTB Side Steps 30xea.     Therapeutic Activity Discussed his condition, pt expectations and prognosis.     Modalities  Ice at joseph. Knees for 10' unbilled after his treatment.    Therapeutic Exercise Min 10 25 25   Neuro Re-Ed Min  20 20   Therapeutic Activity Min 10     Evaluation Min 25     Total of Timed Procedures 20 45 45   Total of Service Based 25 0 0   Total Treatment Time 45 45 45         HEP  Reviewed his original HEP.    Charges     2TE and 1Neuro

## 2025-02-20 ENCOUNTER — OFFICE VISIT (OUTPATIENT)
Dept: PHYSICAL THERAPY | Age: 50
End: 2025-02-20
Attending: PHYSICAL MEDICINE & REHABILITATION
Payer: COMMERCIAL

## 2025-02-20 DIAGNOSIS — M25.562 ACUTE PAIN OF BOTH KNEES: Primary | ICD-10-CM

## 2025-02-20 DIAGNOSIS — M25.561 ACUTE PAIN OF BOTH KNEES: Primary | ICD-10-CM

## 2025-02-20 PROCEDURE — 97112 NEUROMUSCULAR REEDUCATION: CPT

## 2025-02-20 PROCEDURE — 97110 THERAPEUTIC EXERCISES: CPT

## 2025-02-20 NOTE — TELEPHONE ENCOUNTER
LMTCB 3rd attempt    Scheduled procedure with EC (Bruna GARVIN)    Patient has been scheduled appropriately.

## 2025-02-21 NOTE — PROGRESS NOTES
Patient: Edi Villatoro (49 year old, male) Referring Provider:  Insurance:   Diagnosis: Joseph. Knee Pain Reynaldo Behar  [facility]   Date of Surgery: None. Next MD visit:  N/A   Precautions:  None 2/18/25 Referral Information:    Date of Evaluation: Req: 0, Auth: 0, Exp:     02/11/25 POC Auth Visits:  10       Today's Date   2/20/2025    Subjective  Patient reports he has decreased pain this evening. He has been doing his HEP and felt ok after his last visit.       Pain: 2/10     Objective  See Tx Log.          Assessment  Patient presents with bilateral le tightness during his self stretching. The patient tolerated his table and wb strengthening exercises without an increase in pain.    Goals (to be met in 10 visits)   Goals    None         Plan  Plan to work on stretching, strengthening and functional act.    Treatment Last 4 Visits       2/11/2025 2/13/2025 2/18/2025 2/20/2025   PT Treatment   Treatment Day  2 3 4   Therapeutic Exercise Quad Set 10x10\"  Joseph. HS St. 4x30\"  S/L Abd. 20xea. Quad Set 10x10\"  Joseph. HS St. 4x30\"  S/L Abd. 20xea.  Bike 8' L5  SKC and Calf St. 4x30\"xea.  Standing Calf St. 3x30\"   Quad Set 10x10\"  Joseph. HS St. 4x30\"  S/L Abd. 3# 20xea.  Bike 8' L5  SKC and Calf St. 4x30\"xea.  Standing Calf St. 3x30\"   Quad Set 10x10\"  Joseph. HS St. 4x30\"  S/L Abd. 3# 20xea.  Bike 8' L5  SKC and Calf St. 4x30\"xea.  Standing Calf St. 3x30\"  HS Set 10x10\"     Neuro Re-Ed  SLR 20xea.  LAQs 20xea.  SB HS Curls 20x  1/4 Squats 2x20  GTB Side Steps 5 Laps SLR 3# 20xea.  LAQs 3#20xea.  SB HS Curls 20x  1/4 Squats 2x20  GTB Side Steps 30xea.   SLR 3# 20xea.  LAQs 3#20xea.  SB HS Curls 20x  1/4 Squats 2x20  GTB Side Steps 30xea.     Therapeutic Activity Discussed his condition, pt expectations and prognosis.      Modalities  Ice at joseph. Knees for 10' unbilled after his treatment.     Therapeutic Exercise Min 10 25 25 30   Neuro Re-Ed Min  20 20 15   Therapeutic Activity Min 10      Evaluation Min 25       Total of Timed Procedures 20 45 45 45   Total of Service Based 25 0 0 0   Total Treatment Time 45 45 45 45         HEP  Did not add new exercises to his HEP this visit.    Charges     2TE and 1Neuro

## 2025-02-25 ENCOUNTER — OFFICE VISIT (OUTPATIENT)
Dept: PHYSICAL THERAPY | Age: 50
End: 2025-02-25
Attending: PHYSICAL MEDICINE & REHABILITATION
Payer: COMMERCIAL

## 2025-02-25 DIAGNOSIS — M25.561 ACUTE PAIN OF BOTH KNEES: Primary | ICD-10-CM

## 2025-02-25 DIAGNOSIS — M25.562 ACUTE PAIN OF BOTH KNEES: Primary | ICD-10-CM

## 2025-02-25 PROCEDURE — 97140 MANUAL THERAPY 1/> REGIONS: CPT

## 2025-02-25 PROCEDURE — 97112 NEUROMUSCULAR REEDUCATION: CPT

## 2025-02-25 PROCEDURE — 97110 THERAPEUTIC EXERCISES: CPT

## 2025-02-26 NOTE — PROGRESS NOTES
Patient: Edi Villatoro (49 year old, male) Referring Provider:  Insurance:   Diagnosis: Joseph. Knee Pain Reynaldo Behar  [employer]   Date of Surgery: None. Next MD visit:  N/A   Precautions:  None 2/18/25 Referral Information:    Date of Evaluation: Req: 0, Auth: 0, Exp:     02/11/25 POC Auth Visits:  10       Today's Date   2/25/2025    Subjective  Patient reports having joseph. knee pain this evening. He has been doing his HEP and felt ok after his last visit.       Pain: 3/10     Objective  See Tx Log.          Assessment  Patient presents with improved joseph. le strength and flexibility during her table and wb exercises. Added dry needling this visit to his left medial knee pain.    Goals (to be met in 10 visits)   Goals    None         Plan  Plan to work on rom, strengthening and functional act.    Treatment Last 4 Visits       2/13/2025 2/18/2025 2/20/2025 2/25/2025   PT Treatment   Treatment Day 2 3 4 5   Therapeutic Exercise Quad Set 10x10\"  Joseph. HS St. 4x30\"  S/L Abd. 20xea.  Bike 8' L5  SKC and Calf St. 4x30\"xea.  Standing Calf St. 3x30\"   Quad Set 10x10\"  Joseph. HS St. 4x30\"  S/L Abd. 3# 20xea.  Bike 8' L5  SKC and Calf St. 4x30\"xea.  Standing Calf St. 3x30\"   Quad Set 10x10\"  Josehp. HS St. 4x30\"  S/L Abd. 3# 20xea.  Bike 8' L5  SKC and Calf St. 4x30\"xea.  Standing Calf St. 3x30\"  HS Set 10x10\"   Joseph. HS St. 4x30\"  S/L Abd. 3# 20xea.  Bike 8' L5  SKC and Calf St. 4x30\"xea.  Standing Calf St. 3x30\"     Neuro Re-Ed SLR 20xea.  LAQs 20xea.  SB HS Curls 20x  1/4 Squats 2x20  GTB Side Steps 5 Laps SLR 3# 20xea.  LAQs 3#20xea.  SB HS Curls 20x  1/4 Squats 2x20  GTB Side Steps 30xea.   SLR 3# 20xea.  LAQs 3#20xea.  SB HS Curls 20x  1/4 Squats 2x20  GTB Side Steps 30xea.   SLR 3# 20xea.  LAQs 3#20xea.  SB HS Curls 20x  TG Squats 2x20 L6  SL TG Squats 20xea. L5  GTB Side Steps 30xea.        Manual Therapy    Dry needling at the medial left knee   Modalities Ice at joseph. Knees for 10' unbilled after his treatment.       Therapeutic Exercise Min 25 25 30 15   Neuro Re-Ed Min 20 20 15 25   Manual Therapy Min    8   Total of Timed Procedures 45 45 45 48   Total of Service Based 0 0 0 0   Total Treatment Time 45 45 45 48         HEP  Did not add new exercises to his HEP this visit.    Charges     1TE, 2Neuro and 1MT

## 2025-02-27 ENCOUNTER — OFFICE VISIT (OUTPATIENT)
Dept: PHYSICAL THERAPY | Age: 50
End: 2025-02-27
Attending: PHYSICAL MEDICINE & REHABILITATION
Payer: COMMERCIAL

## 2025-02-27 PROCEDURE — 97140 MANUAL THERAPY 1/> REGIONS: CPT

## 2025-02-27 PROCEDURE — 97112 NEUROMUSCULAR REEDUCATION: CPT

## 2025-02-27 PROCEDURE — 97110 THERAPEUTIC EXERCISES: CPT

## 2025-02-28 NOTE — PROGRESS NOTES
Patient: Edi Villatoro (49 year old, male) Referring Provider:  Insurance:   Diagnosis: Blayne. Knee Pain Reynaldo Behar  [employer]   Date of Surgery: None. Next MD visit:  N/A   Precautions:  None 2/18/25 Referral Information:    Date of Evaluation: Req: 0, Auth: 0, Exp:     02/11/25 POC Auth Visits:  10       Today's Date   2/27/2025    Subjective  \"My knee feels less pinch than last time\"       Pain: 2/10     Objective  Difficulty w/ single leg balance on LLE. Pain w/ perturbations using Leg Press on LLE.           Assessment  Pt reporting decreased discomfort in L knee at start of session. Pt treatment emphasis on static and dynamic balance. Pt demonstrating increased difficulty w/ single leg tasks on LLE. Pt requiring use of TB for cue to keep toes on ground. Pt w/ good neutral position w/ forward reaching. Dry needling at end of session for L knee to promote joint laxity and decrease feelings of pinching.    Goals (to be met in 10 visits)   Goals    None         Plan  Progress tolerance to dynamic and static balance tasks as tolerated. Decrease pain w/ dynamic and static weightbearing activities.    Treatment Last 4 Visits       2/18/2025 2/20/2025 2/25/2025 2/27/2025   PT Treatment   Treatment Day 3 4 5 6   Therapeutic Exercise Quad Set 10x10\"  Blayne. HS St. 4x30\"  S/L Abd. 3# 20xea.  Bike 8' L5  SKC and Calf St. 4x30\"xea.  Standing Calf St. 3x30\"   Quad Set 10x10\"  Blayne. HS St. 4x30\"  S/L Abd. 3# 20xea.  Bike 8' L5  SKC and Calf St. 4x30\"xea.  Standing Calf St. 3x30\"  HS Set 10x10\"   Blayne. HS St. 4x30\"  S/L Abd. 3# 20xea.  Bike 8' L5  SKC and Calf St. 4x30\"xea.  Standing Calf St. 3x30\"   Resisted Walk Outs Fwd/Bckwd, Lateral  Double Leg press 8C  Single Leg Press 8C  Calf Raises 6C  Lateral Shuffling w/ Black TB   Neuro Re-Ed SLR 3# 20xea.  LAQs 3#20xea.  SB HS Curls 20x  1/4 Squats 2x20  GTB Side Steps 30xea.   SLR 3# 20xea.  LAQs 3#20xea.  SB HS Curls 20x  1/4 Squats 2x20  GTB Side Steps 30xea.   SLR  3# 20xea.  LAQs 3#20xea.  SB HS Curls 20x  TG Squats 2x20 L6  SL TG Squats 20xea. L5  GTB Side Steps 30xea.      Double Leg Plyos 4C  Single Leg Plyos 4C  Single Leg Forward Reach to Cone  Single Leg 3 Way Reach to Cone  Tandem walking on Foam w/ Single Arm KB hold   Manual Therapy   Dry needling at the medial left knee Dry Needling to L Knee   Therapeutic Exercise Min 25 30 15 15   Neuro Re-Ed Min 20 15 25 25   Manual Therapy Min   8 10   Total of Timed Procedures 45 45 48 50   Total of Service Based 0 0 0 0   Total Treatment Time 45 45 48 50         HEP  Did not add new exercises to his HEP this visit.    Charges        1 Manual, 1 Ther-Ex, 1 Neuro Re-Ed

## 2025-03-03 ENCOUNTER — OFFICE VISIT (OUTPATIENT)
Dept: PHYSICAL THERAPY | Age: 50
End: 2025-03-03
Attending: PHYSICAL MEDICINE & REHABILITATION
Payer: COMMERCIAL

## 2025-03-03 DIAGNOSIS — M25.561 ACUTE PAIN OF BOTH KNEES: Primary | ICD-10-CM

## 2025-03-03 DIAGNOSIS — M25.562 ACUTE PAIN OF BOTH KNEES: Primary | ICD-10-CM

## 2025-03-03 PROCEDURE — 97110 THERAPEUTIC EXERCISES: CPT

## 2025-03-03 PROCEDURE — 97140 MANUAL THERAPY 1/> REGIONS: CPT

## 2025-03-03 PROCEDURE — 97112 NEUROMUSCULAR REEDUCATION: CPT

## 2025-03-04 NOTE — PROGRESS NOTES
Patient: Edi Villatoro (49 year old, male) Referring Provider:  Insurance:   Diagnosis: Blayne. Knee Pain Reynaldo Behar  [employer]   Date of Surgery: None. Next MD visit:  N/A   Precautions:  None 2/18/25 Referral Information:    Date of Evaluation: Req: 0, Auth: 0, Exp:     02/11/25 POC Auth Visits:  10       Today's Date   3/3/2025    Subjective  Patient reports right lateral knee pain this evening. He has been doing his HEP and felt ok after his last visit.       Pain: 2/10     Objective  See Tx Log.        Assessment  Patient presents with some right lateral knee pain with wb strengthening exercises. Did dry needling on the right lateral knee to decrease his symptoms.    Goals (to be met in 10 visits)   Goals    None         Plan  Plan to work on stretching, strengthening and functional act.    Treatment Last 4 Visits       2/20/2025 2/25/2025 2/27/2025 3/3/2025   PT Treatment   Treatment Day 4 5 6 7   Therapeutic Exercise Quad Set 10x10\"  Blayne. HS St. 4x30\"  S/L Abd. 3# 20xea.  Bike 8' L5  SKC and Calf St. 4x30\"xea.  Standing Calf St. 3x30\"  HS Set 10x10\"   Blayne. HS St. 4x30\"  S/L Abd. 3# 20xea.  Bike 8' L5  SKC and Calf St. 4x30\"xea.  Standing Calf St. 3x30\"   Resisted Walk Outs Fwd/Bckwd, Lateral  Double Leg press 8C  Single Leg Press 8C  Calf Raises 6C  Lateral Shuffling w/ Black TB Double Leg press 8C 2x20  Single Leg Press 8C and 7C Right LE 20xea,  SL Calf Raises 30xea.  Lateral Shuffling w/ Black TB  Standing Calf St. 3x30\"     Neuro Re-Ed SLR 3# 20xea.  LAQs 3#20xea.  SB HS Curls 20x  1/4 Squats 2x20  GTB Side Steps 30xea.   SLR 3# 20xea.  LAQs 3#20xea.  SB HS Curls 20x  TG Squats 2x20 L6  SL TG Squats 20xea. L5  GTB Side Steps 30xea.      Double Leg Plyos 4C  Single Leg Plyos 4C  Single Leg Forward Reach to [facility]  Single Leg 3 Way Reach to [facility]  Tandem walking on Foam w/ Single Arm KB hold Double Leg Plyos 4C 20x  Single Leg Plyos 4C 20xea.  BTB Monster Walks Fwd and Bck 30xea.  SLS Soccer  Throw-ins 20xea.     Manual Therapy  Dry needling at the medial left knee Dry Needling to L Knee Dry needling at the knees.   Therapeutic Exercise Min 30 15 15 20   Neuro Re-Ed Min 15 25 25 15   Manual Therapy Min  8 10 15   Total of Timed Procedures 45 48 50 50   Total of Service Based 0 0 0 0   Total Treatment Time 45 48 50 50         HEP  Added new exercises to his HEP.    Charges     1TE, 1Neuro and 1MT

## 2025-03-20 ENCOUNTER — HOSPITAL ENCOUNTER (OUTPATIENT)
Dept: MRI IMAGING | Facility: HOSPITAL | Age: 50
Discharge: HOME | End: 2025-03-20
Attending: PHYSICAL MEDICINE & REHABILITATION
Payer: COMMERCIAL

## 2025-03-20 DIAGNOSIS — M23.312 INTERNAL DERANGEMENT OF LEFT KNEE INVOLVING ANTERIOR HORN OF MEDIAL MENISCUS: ICD-10-CM

## 2025-03-20 DIAGNOSIS — S83.207A TEAR OF LEFT MENISCUS AS CURRENT INJURY, INITIAL ENCOUNTER: ICD-10-CM

## 2025-03-20 DIAGNOSIS — M23.51 CHRONIC INSTABILITY OF RIGHT KNEE INVOLVING POSTERIOR HORN OF LATERAL MENISCUS: ICD-10-CM

## 2025-03-20 DIAGNOSIS — M17.10 PRIMARY OSTEOARTHRITIS OF KNEE, UNSPECIFIED LATERALITY: ICD-10-CM

## 2025-03-20 DIAGNOSIS — M22.2X9 PATELLOFEMORAL PAIN SYNDROME, UNSPECIFIED LATERALITY: ICD-10-CM

## 2025-03-20 PROCEDURE — 73721 MRI JNT OF LWR EXTRE W/O DYE: CPT | Performed by: PHYSICAL MEDICINE & REHABILITATION

## 2025-03-31 ENCOUNTER — OFFICE VISIT (OUTPATIENT)
Dept: PHYSICAL MEDICINE AND REHAB | Facility: CLINIC | Age: 50
End: 2025-03-31
Payer: COMMERCIAL

## 2025-03-31 DIAGNOSIS — M23.312 INTERNAL DERANGEMENT OF LEFT KNEE INVOLVING ANTERIOR HORN OF MEDIAL MENISCUS: ICD-10-CM

## 2025-03-31 DIAGNOSIS — M17.10 PRIMARY OSTEOARTHRITIS OF KNEE, UNSPECIFIED LATERALITY: ICD-10-CM

## 2025-03-31 DIAGNOSIS — M22.2X9 PATELLOFEMORAL PAIN SYNDROME, UNSPECIFIED LATERALITY: Primary | ICD-10-CM

## 2025-03-31 DIAGNOSIS — M23.51 CHRONIC INSTABILITY OF RIGHT KNEE INVOLVING POSTERIOR HORN OF LATERAL MENISCUS: ICD-10-CM

## 2025-03-31 DIAGNOSIS — S83.207A TEAR OF LEFT MENISCUS AS CURRENT INJURY, INITIAL ENCOUNTER: ICD-10-CM

## 2025-03-31 RX ORDER — TRIAMCINOLONE ACETONIDE 40 MG/ML
80 INJECTION, SUSPENSION INTRA-ARTICULAR; INTRAMUSCULAR ONCE
Status: COMPLETED | OUTPATIENT
Start: 2025-03-31 | End: 2025-03-31

## 2025-03-31 RX ORDER — LIDOCAINE HYDROCHLORIDE 10 MG/ML
14 INJECTION, SOLUTION INFILTRATION; PERINEURAL ONCE
Status: COMPLETED | OUTPATIENT
Start: 2025-03-31 | End: 2025-03-31

## 2025-03-31 NOTE — PATIENT INSTRUCTIONS
Post Injection Instructions     Please do not do anything strenuous over the next two days (if you had a knee injection do not walk more than 2 city blocks, do not attend any aerobic classes, do not run, no heavy lifting, no prolong standing).  You may resume your day to day activities after your injection.  You may experience some mild amount of swelling after the procedure.  Please ice your joint that was injected at least 5-6 times a day (15 minutes) for two days after (this will help prevent worsening pain that sometimes occurs after an injection).  Only take tylenol if needed for pain for the first few days.  Watch for signs of infection which include redness, warmth, worsening pain, fevers or chills.  If you develop any of these signs call the office immediately at [phone]    Everyone responds differently to injections, but you can expect your peak effects a few weeks after your last injection.  Alex B. Behar MD  Physical Medicine and Rehabilitation/Sports Medicine  [facility]

## 2025-03-31 NOTE — PROCEDURES
[facility]   Knee Joint Injection Procedure Note    CHIEF COMPLAINT:    Chief Complaint   Patient presents with    Injection     Bilateral knee HA, Csi USG       PROCEDURE PERFORMED:  BILATERAL knee intra-articular Durolane and corticosteroid injection under ultrasound guidance    INDICATIONS:  BILATERAL knee pain and osteoarthritis    PRIMARY PROCEDURALIST:  Alex Behar, MD    INFORMED CONSENT & TIME OUT:   As documented in the Time Out and Pre-Procedure Check Lists.  Verbal consent was obtained    Vitals: [unfilled]  Labs (document last wbc, plts, hgb, and PT/INR):    No visits with results within 6 Month(s) from this visit.   Latest known visit with results is:   [facility] Lab Encounter on 02/18/2023   Component Date Value Ref Range Status    Glucose 02/18/2023 94  70 - 99 mg/dL Final    Sodium 02/18/2023 144  136 - 145 mmol/L Final    Potassium 02/18/2023 4.3  3.5 - 5.1 mmol/L Final    Chloride 02/18/2023 110  98 - 112 mmol/L Final    CO2 02/18/2023 31.0  21.0 - 32.0 mmol/L Final    Anion Gap 02/18/2023 3  0 - 18 mmol/L Final    BUN 02/18/2023 12  7 - 18 mg/dL Final    Creatinine 02/18/2023 1.14  0.70 - 1.30 mg/dL Final    BUN/CREA Ratio 02/18/2023 10.5  10.0 - 20.0 Final    Calcium, Total 02/18/2023 8.9  8.5 - 10.1 mg/dL Final    Calculated Osmolality 02/18/2023 298 (H)  275 - 295 mOsm/kg Final    eGFR-Cr 02/18/2023 80  >=60 mL/min/1.73m2 Final    ALT 02/18/2023 61  16 - 61 U/L Final    AST 02/18/2023 41 (H)  15 - 37 U/L Final    Alkaline Phosphatase 02/18/2023 98  45 - 117 U/L Final    Bilirubin, Total 02/18/2023 0.4  0.1 - 2.0 mg/dL Final    Total Protein 02/18/2023 7.7  6.4 - 8.2 g/dL Final    Albumin 02/18/2023 3.8  3.4 - 5.0 g/dL Final    Globulin  02/18/2023 3.9  2.8 - 4.4 g/dL Final    A/G Ratio 02/18/2023 1.0  1.0 - 2.0 Final    Patient Fasting for CMP? 02/18/2023 Yes   Final    Lipase 02/18/2023 145  73 - 393 U/L Final    Lipase 02/18/2023 32  13 - 75 U/L Final     WBC 02/18/2023 6.6  4.0 - 11.0 x10(3) uL Final    RBC 02/18/2023 5.08  4.30 - 5.70 x10(6)uL Final    HGB 02/18/2023 14.0  13.0 - 17.5 g/dL Final    HCT 02/18/2023 42.0  39.0 - 53.0 % Final    MCV 02/18/2023 82.7  80.0 - 100.0 fL Final    MCH 02/18/2023 27.6  26.0 - 34.0 pg Final    MCHC 02/18/2023 33.3  31.0 - 37.0 g/dL Final    RDW-SD 02/18/2023 37.2  35.1 - 46.3 fL Final    RDW 02/18/2023 12.2  11.0 - 15.0 % Final    PLT 02/18/2023 385.0  150.0 - 450.0 10(3)uL Final    Neutrophil Absolute Prelim 02/18/2023 3.36  1.50 - 7.70 x10 (3) uL Final    Neutrophil Absolute 02/18/2023 3.36  1.50 - 7.70 x10(3) uL Final    Lymphocyte Absolute 02/18/2023 2.65  1.00 - 4.00 x10(3) uL Final    Monocyte Absolute 02/18/2023 0.43  0.10 - 1.00 x10(3) uL Final    Eosinophil Absolute 02/18/2023 0.13  0.00 - 0.70 x10(3) uL Final    Basophil Absolute 02/18/2023 0.03  0.00 - 0.20 x10(3) uL Final    Immature Granulocyte Absolute 02/18/2023 0.01  0.00 - 1.00 x10(3) uL Final    Neutrophil % 02/18/2023 50.7  % Final    Lymphocyte % 02/18/2023 40.1  % Final    Monocyte % 02/18/2023 6.5  % Final    Eosinophil % 02/18/2023 2.0  % Final    Basophil % 02/18/2023 0.5  % Final    Immature Granulocyte % 02/18/2023 0.2  % Final   ]    PROCEDURE:    LEFT knee:    PROCEDURE:  The procedure, risks, benefits and alternatives were discussed with the patient.  Patient verbalized understanding and gave consent.  The LEFT knee was prepped and draped in the usual sterile fashion. Using a linear ultrasound probe, the superolateral patella-femoral joint space was visualized. Using a 30-gauge, 1/2-inch needle, 1 cc of 1% lidocaine was used to numb the skin and soft tissues in the superolateral approach.  The intra-articular space was then accessed using an 18-gauge, 1-1/2-inch needle, which was visualized on ultrasound, using approximately 5 cc of 1% lidocaine to numb the soft tissue.  Once the intra-articular space was visualized on ultrasound, with the needle  accessing the space, the syringe was traded for an empty 10 cc syringe and aspiration was attempted. 8 cc of serous straw colored fluid was removed from the LEFT knee. The Durolane  injection was attached to the needle and injected. During the injection, the Durolane was noted to enter the intra-articular space. The Durolane syringe was then switched out for a syringe containing 2 cc of 1% lidocaine and 1 cc of 40 mg/cc triamcinolone which was injected into the same space. The needle was then removed and hemostasis was achieved.  Skin was cleansed and a dry dressing was placed.    Patient tolerated the procedure well and no immediate complications noted.     RIGHT knee:    PROCEDURE:  The procedure, risks, benefits and alternatives were discussed with the patient.  Patient verbalized understanding and gave consent.  The RIGHT knee was prepped and draped in the usual sterile fashion. Using a linear ultrasound probe, the superolateral patella-femoral joint space was visualized. Using a 30-gauge, 1/2-inch needle, 1 cc of 1% lidocaine was used to numb the skin and soft tissues in the superolateral approach.  The intra-articular space was then accessed using an 18-gauge, 1-1/2-inch needle, which was visualized on ultrasound, using approximately 5 cc of 1% lidocaine to numb the soft tissue.  Once the intra-articular space was visualized on ultrasound, with the needle accessing the space, the syringe was traded for an empty 10 cc syringe and aspiration was attempted. 42 cc of serous straw colored fluid was removed from the RIGHT knee. The Durolane injection was attached to the needle and injected. During the injection, the Durolane was noted to enter the intra-articular space. The Durolane syringe was then switched out for a syringe containing 2 cc of 1% lidocaine and 1 cc of 40 mg/cc triamcinolone which was injected into the same space. The needle was then removed and hemostasis was achieved.  Skin was cleansed and a dry  dressing was placed.      Patient tolerated the procedure well and no immediate complications noted.       Patient verbalized understanding of assessment and plan.  Patient is in agreement with the plan.  All questions were answered.  No barriers to learning identified. Permanent pictures were saved in our PACS systeme.       INSTRUCTIONS GIVEN TO PATIENT:    \"You will see an effect in the next 2-3 days.  Please contact me if you have fevers, worsening swelling, worsening pain, decreased range of motion, increased redness, chills, or anything that makes you concerned about how the joint we injected feels/looks.  If you do not reach me in a reasonable time, please report directly to the emergency room for further evaluation\"    2 months     Alex B. Behar MD, [facility]  Physical Medicine and Rehabilitation/Sports Medicine  [facility]

## 2025-06-10 ENCOUNTER — OFFICE VISIT (OUTPATIENT)
Dept: PHYSICAL MEDICINE AND REHAB | Facility: CLINIC | Age: 50
End: 2025-06-10
Payer: COMMERCIAL

## 2025-06-10 VITALS — WEIGHT: 185 LBS | BODY MASS INDEX: 31.58 KG/M2 | HEIGHT: 64 IN

## 2025-06-10 DIAGNOSIS — S83.207A TEAR OF LEFT MENISCUS AS CURRENT INJURY, INITIAL ENCOUNTER: ICD-10-CM

## 2025-06-10 DIAGNOSIS — M17.10 PRIMARY OSTEOARTHRITIS OF KNEE, UNSPECIFIED LATERALITY: ICD-10-CM

## 2025-06-10 DIAGNOSIS — M22.2X9 PATELLOFEMORAL PAIN SYNDROME, UNSPECIFIED LATERALITY: Primary | ICD-10-CM

## 2025-06-10 DIAGNOSIS — M23.51 CHRONIC INSTABILITY OF RIGHT KNEE INVOLVING POSTERIOR HORN OF LATERAL MENISCUS: ICD-10-CM

## 2025-06-10 DIAGNOSIS — M23.312 INTERNAL DERANGEMENT OF LEFT KNEE INVOLVING ANTERIOR HORN OF MEDIAL MENISCUS: ICD-10-CM

## 2025-06-10 PROCEDURE — 99214 OFFICE O/P EST MOD 30 MIN: CPT | Performed by: PHYSICAL MEDICINE & REHABILITATION

## 2025-06-10 NOTE — PATIENT INSTRUCTIONS
1) Make an appointment with orthopedics to discuss surgical options.   2) If surgery is not an option, then let me know and we can perform a RIGHT knee CSI under US after June 30, 2025   3) Use Naprosyn if needed

## 2025-06-10 NOTE — PROGRESS NOTES
[facility]  Progress Note    CHIEF COMPLAINT:    Chief Complaint   Patient presents with    Follow - Up     LOV 3/31/25 with  BILATERAL knee intra-articular Durolane and corticosteroid injection under ultrasound guidance. Pt reports improvement until 1 month ago, now pain is low in L knee, returned in R knee. Pain reaches 8/10 when stressing knee. Pt takes naproxen or tylenol rarely. PT is completed with some improvement but pt could never fully bend.       History of Present Illness  Edi Villatoro is a 50 year old male who presents with persistent right knee pain and swelling.    He has been experiencing persistent right knee pain and swelling for approximately six months. Despite undergoing a procedure on March 31st where fluid was aspirated from both knees, with 8 cc removed from the left knee and 42 cc from the right knee, the symptoms have persisted. The sensation in his right knee is described as feeling 'full' and similar to previous episodes, with a feeling that it 'wants to lock'.    Prior treatments, including injections and therapy, provided relief for about two months but did not result in long-term improvement. He is currently not taking any regular medication for his knee pain but has Naprosyn available, which he uses as needed. He is trying to avoid other medications like Tylenol or Aleve.    The knee issues have impacted his daily activities, including getting out of his truck, and he wants to return to normal function without the need for frequent stretching or adjustments.       PAST MEDICAL HISTORY:  Past Medical History[1]    SURGICAL HISTORY:  Past Surgical History[2]    SOCIAL HISTORY:   Social History     Occupational History    Not on file   Tobacco Use    Smoking status: Never     Passive exposure: Never    Smokeless tobacco: Never   Vaping Use    Vaping status: Never Used   Substance and Sexual Activity    Alcohol use: Yes     Comment:  occasionally    Drug use: Never    Sexual activity: Not on file       FAMILY HISTORY:   Family History[3]    CURRENT MEDICATIONS:   Current Medications[4]    ALLERGIES:   Allergies[5]    REVIEW OF SYSTEMS:   Review of Systems   Constitutional: Negative.    HENT: Negative.    Eyes: Negative.    Respiratory: Negative.    Cardiovascular: Negative.    Gastrointestinal: Negative.    Genitourinary: Negative.    Musculoskeletal: As per HPI  Skin: Negative.    Neurological: As per HPI  Endo/Heme/Allergies: Negative.    Psychiatric/Behavioral: Negative.      All other systems reviewed and are negative. Pertinent positives and negatives noted in the HPI.    PHYSICAL EXAM:   Ht 64\"   Wt 185 lb (83.9 kg)   BMI 31.76 kg/m²     Body mass index is 31.76 kg/m².      General: No immediate distress  Head: Normocephalic/ Atraumatic  Eyes: Extra-occular movements intact.   Ears: No auricular hematoma or deformities  Mouth: No lesions or ulcerations  Heart: peripheral pulses intact. Normal capillary refill.   Lungs: Non-labored respirations  Abdomen: No abdominal guarding  Extremities: No lower extremity edema bilaterally   Skin: No lesions noted.   Cognition: alert & oriented x 3, attentive, able to follow 2 step commands, comprehension intact, spontaneous speech intact  Motor:    Musculoskeletal:        Data  No visits with results within 6 Month(s) from this visit.   Latest known visit with results is:   [facility] Lab Encounter on 02/18/2023   Component Date Value Ref Range Status    Glucose 02/18/2023 94  70 - 99 mg/dL Final    Sodium 02/18/2023 144  136 - 145 mmol/L Final    Potassium 02/18/2023 4.3  3.5 - 5.1 mmol/L Final    Chloride 02/18/2023 110  98 - 112 mmol/L Final    CO2 02/18/2023 31.0  21.0 - 32.0 mmol/L Final    Anion Gap 02/18/2023 3  0 - 18 mmol/L Final    BUN 02/18/2023 12  7 - 18 mg/dL Final    Creatinine 02/18/2023 1.14  0.70 - 1.30 mg/dL Final    BUN/CREA Ratio 02/18/2023 10.5  10.0 - 20.0 Final    Calcium, Total  02/18/2023 8.9  8.5 - 10.1 mg/dL Final    Calculated Osmolality 02/18/2023 298 (H)  275 - 295 mOsm/kg Final    eGFR-Cr 02/18/2023 80  >=60 mL/min/1.73m2 Final    ALT 02/18/2023 61  16 - 61 U/L Final    AST 02/18/2023 41 (H)  15 - 37 U/L Final    Alkaline Phosphatase 02/18/2023 98  45 - 117 U/L Final    Bilirubin, Total 02/18/2023 0.4  0.1 - 2.0 mg/dL Final    Total Protein 02/18/2023 7.7  6.4 - 8.2 g/dL Final    Albumin 02/18/2023 3.8  3.4 - 5.0 g/dL Final    Globulin  02/18/2023 3.9  2.8 - 4.4 g/dL Final    A/G Ratio 02/18/2023 1.0  1.0 - 2.0 Final    Patient Fasting for CMP? 02/18/2023 Yes   Final    Lipase 02/18/2023 145  73 - 393 U/L Final    Lipase 02/18/2023 32  13 - 75 U/L Final    WBC 02/18/2023 6.6  4.0 - 11.0 x10(3) uL Final    RBC 02/18/2023 5.08  4.30 - 5.70 x10(6)uL Final    HGB 02/18/2023 14.0  13.0 - 17.5 g/dL Final    HCT 02/18/2023 42.0  39.0 - 53.0 % Final    MCV 02/18/2023 82.7  80.0 - 100.0 fL Final    MCH 02/18/2023 27.6  26.0 - 34.0 pg Final    MCHC 02/18/2023 33.3  31.0 - 37.0 g/dL Final    RDW-SD 02/18/2023 37.2  35.1 - 46.3 fL Final    RDW 02/18/2023 12.2  11.0 - 15.0 % Final    PLT 02/18/2023 385.0  150.0 - 450.0 10(3)uL Final    Neutrophil Absolute Prelim 02/18/2023 3.36  1.50 - 7.70 x10 (3) uL Final    Neutrophil Absolute 02/18/2023 3.36  1.50 - 7.70 x10(3) uL Final    Lymphocyte Absolute 02/18/2023 2.65  1.00 - 4.00 x10(3) uL Final    Monocyte Absolute 02/18/2023 0.43  0.10 - 1.00 x10(3) uL Final    Eosinophil Absolute 02/18/2023 0.13  0.00 - 0.70 x10(3) uL Final    Basophil Absolute 02/18/2023 0.03  0.00 - 0.20 x10(3) uL Final    Immature Granulocyte Absolute 02/18/2023 0.01  0.00 - 1.00 x10(3) uL Final    Neutrophil % 02/18/2023 50.7  % Final    Lymphocyte % 02/18/2023 40.1  % Final    Monocyte % 02/18/2023 6.5  % Final    Eosinophil % 02/18/2023 2.0  % Final    Basophil % 02/18/2023 0.5  % Final    Immature Granulocyte % 02/18/2023 0.2  % Final   ]      Radiology Imaging:  I reviewed  with the patient his MRI of the knees bilateral  MRI KNEE, RIGHT (MCT=[id number])  Narrative: PROCEDURE: MRI KNEE, RIGHT ([id number]=[id number])     COMPARISON: [facility], XR KNEE COMPLETE BILAT EM(NEA=[id number]), 11/12/2024, 4:38 PM.     INDICATIONS: S83.207A Tear of left meniscus as current injury, initial encounter M23.51 Chronic instability of right knee involving posterior horn of lateral meniscus M23.3*     TECHNIQUE: A complete multi-planar, multisequence MRI of the knee was performed.       FINDINGS:     MEDIAL MENISCUS: Intact     LATERAL MENISCUS: Abnormal signal and morphology is seen along the posterior horn and body which does not follow a specific type of tear pattern suggestive of a complex degenerative tear.  Radial and oblique components are seen within the posterior horn   and body.  There is lateral extrusion of the body of the lateral meniscus by approximately 4-5 mm.     ACL: Intact  PCL: Intact  MCL: Intact  LCL COMPLEX: Intact     PATELLOFEMORAL EXTENSOR MECHANISM: Quadriceps and patellar tendons are normal.  Small amount of increased fluid within the deep infrapatellar bursa..       ARTICULAR CARTILAGE:  Full-thickness chondral loss within the lateral femoral condyle and lateral tibial plateau.  Small area of full-thickness chondral fissuring within the medial femoral condyle with subchondral cysts.     BONE MARROW: Subchondral cyst within the medial femoral condyle. Remainder of the osseous structures are otherwise intact without acute fracture, dislocation, or marrow replacing lesion.     JOINT FLUID: Small suprapatellar joint effusion.     OTHER:  No mass or loculated collection. The muscles have a normal signal intensity and bulk.              Impression: CONCLUSION:      Mild lateral compartment osteoarthritis.     Complex degenerative tear of the posterior horn and body of the lateral meniscus.           Dictated by (CST): Modesto Bain MD on 3/26/2025 at 2:32 PM       Finalized by (CST):  Modesto Bain MD on 3/26/2025 at 2:37 PM            PROCEDURE: MRI KNEE, LEFT ([id number]=[id number])     COMPARISON: [facility],  KNEE COMPLETE BILAT EM(QPN=[id number]), 11/12/2024, 4:38 PM.     INDICATIONS: M23.312 Internal derangement of left knee involving anterior horn of medial meniscus M23.51 Chronic instability of right knee involving posterior horn of later*.  History of right knee meniscal surgery approximately 22 years ago.     TECHNIQUE: A complete multi-planar MRI was performed.       FINDINGS:  MENISCI:  MEDIAL MENISCUS: Ruffled appearance of the free edge of the medial meniscus is noted at the body series 8, image 14.  There is mild signal abnormality at the tibial surface at this location extending to the posterior horn series 9 images 5-7.  LATERAL MENISCUS: Normal.  No visible tear or significant degeneration.       LIGAMENTS:  ACL: Normal appearing ligament.    PCL: Normal appearing ligament.    LCL COMPLEX: Normal lateral collateral ligament, fascicles, lateral capsule and ligaments.    MCL COMPLEX: Normal medial collateral ligament and medial capsule.       CARTILAGE:  PATELLOFEMORAL: Mild chondral thinning and heterogeneity.  MEDIAL COMPARTMENT: Normal.  LATERAL COMPARTMENT: There is a small fissure/defect in the weight-bearing portions of the lateral femoral condyle series 8 image 17 measuring 2 mm.     EXTENSOR MECHANISM: No acute abnormality.     SYNOVIAL SPACE: Small joint effusion.  Mild synovitis.     BONES: No acute fracture.  No marrow abnormality.     OTHER: The neurovascular bundles are intact.  No Baker's cyst.  Subtle high signal in the popliteus muscle and medial aspect of the soleus is seen series 4, image 24.                     Impression   CONCLUSION:     1. Probable medial meniscal flounce at the body with probable mild complex tibial surface tear extending from the body to the posterior horn.  The lateral meniscus is intact.     2. Small chondral fissure/defect measuring 2  mm in the weight-bearing portion of the lateral femoral condyle.  Mild chondral heterogeneity in the patellofemoral compartment.     3. Small joint effusion with mild synovitis.     4. Mild popliteus and soleus muscle strains.           Dictated by (CST): Uzair Marcus MD on 12/30/2024 at 7:15 AM      Finalized by (CST): Uzair Marcus MD on 12/30/2024 at 7:44 AM       ASSESSMENT AND PLAN:  The patient is a pleasant 50-year-old male presents for follow-up of his bilateral knee pain (right greater than left).  I have reviewed his MRI of the right knee where he has an extrusion of the lateral meniscus which is where he is having most of his pain.  I am recommending orthopedic consultation to discuss surgical options.  If surgery is not indicated, then I would recommend a repeat right knee corticosteroid injection under ultrasound guidance.  He should continue Naprosyn and Tylenol for pain as needed.     Assessment & Plan  Right knee effusion  Chronic effusion with significant fluid accumulation despite previous interventions. Surgical intervention suggested due to recurrence and limited improvement with conservative measures. Arthroscopic surgery discussed as a minimally invasive option.  - Refer to orthopedic surgeon Ethan Ballard for surgical options.  - Consider right knee steroid injection under ultrasound after June 30, 2025, if surgery not pursued.  - Continue Naprosyn 500 MG Oral as needed for pain.      RTC in as needed  Discharge Instructions were provided as documented in AVS summary.  The patient was in agreement with the assessment and plan.  All questions were answered.  There were no barriers to learning.         1. Patellofemoral pain syndrome, unspecified laterality    2. Primary osteoarthritis of knee, unspecified laterality    3. Internal derangement of left knee involving anterior horn of medial meniscus    4. Chronic instability of right knee involving posterior horn of lateral meniscus    5.  Tear of left meniscus as current injury, initial encounter        Alex B. Behar MD  Physical Medicine and Rehabilitation/Sports Medicine  [address] Cures Act Notice to Patient: Medical documents like this are made available to patients in the interest of transparency. However, be advised this is a medical document and it is intended as evkr-ka-trdt communication between your medical providers. This medical document may contain abbreviations, assessments, medical data, and results or other terms that are unfamiliar. Medical documents are intended to carry relevant information, facts as evident, and the clinical opinion of the practitioner. As such, this medical document may be written in language that appears blunt or direct. You are encouraged to contact your medical provider and/or [facility] Experience if you have any questions about this medical document.   Abridge tool was used for dictation purposes only and the patient was not recorded at any point during the visit.              [1]   Past Medical History:   Kidney stone    2013   [2] No past surgical history on file.  [3]   Family History  Problem Relation Age of Onset    Hypertension Father     Hypertension Mother     No Known Problems Sister     No Known Problems Brother    [4]   Current Outpatient Medications   Medication Sig Dispense Refill    naproxen (NAPROSYN) 500 MG Oral Tab Take 1 tablet (500 mg total) by mouth 2 (two) times daily with meals. Take for 2 weeks as directed and then as needed. 60 tablet 0   [5] No Known Allergies

## (undated) NOTE — LETTER
11/16/2020          To Whom It May Concern:    Heather Amezcuahmann is currently under my medical care and may return to work on 11/19/2020. Activity is restricted as follows: Must wear mask while at work.     If you require additional information please con

## (undated) NOTE — LETTER
AUTHORIZATION FOR SURGICAL OPERATION OR OTHER PROCEDURE    1. I hereby authorize Dr. Alex Behar and the [facility] staff assigned to my case to perform the following operation and/or procedure at the [facility]:    Bilateral knee injections    2.  My physician has explained the nature and purpose of the operation or other procedure, possible alternative methods of treatment, the risks involved, and the possibility of complication to me.  I acknowledge that no guarantee has been made as to the result that may be obtained.  3.  I recognize that, during the course of this operation, or other procedure, unforseen conditions may necessitate additional or different procedure than those listed above.  I, therefore, further authorize and request that the above named physician, his/her physician assistants or designees perform such procedures as are, in his/her professional opinion, necessary and desirable.  4.  Any tissue or organs removed in the operation or other procedure may be disposed of by and at the discretion of the [facility] staff and [facility].  5.  I understand that in the event of a medical emergency, I will be transported by local paramedics to [facility] or other hospital emergency department.  6.  I certify that I have read and fully understand the above consent to operation and/or other procedure.    7.  I acknowledge that my physician has explained sedation/analgesia administration to me including the risks and benefits.  I consent to the administration of sedation/analgesia as may be necessary or desirable in the judgement of my physician.    Witness signature: ___________________________________________________ Date:  ______/______/_____                    Time:  ________ A.M.  P.M.       Patient Name:  Edi Villatoro  4/22/1975  [id number]         Patient signature:  ___________________________________________________               Statement of Physician  My  signature below affirms that prior to the time of the procedure, I have explained to the patient and/or his/her guardian, the risks and benefits involved in the proposed treatment and any reasonable alternative to the proposed treatment.  I have also explained the risks and benefits involved in the refusal of the proposed treatment and have answered the patient's questions.                        Date:  ______/______/_______  Provider                      Signature:  __________________________________________________________       Time:  ___________ A.M    P.M.

## (undated) NOTE — LETTER
11/4/2020          To Whom It May Concern:    Lopez Peace is currently under my medical care and may not return to work at this time. Please excuse Rosario Cardenas for 12 days pending test results and repeat evaluation on 11/16/2020.    If you require a

## (undated) NOTE — LETTER
AUTHORIZATION FOR SURGICAL OPERATION OR OTHER PROCEDURE    1. I hereby authorize Dr. Alex Behar and the [facility] staff assigned to my case to perform the following operation and/or procedure at the [facility]:    BILATERAL knee HA and CSI under US     2.  My physician has explained the nature and purpose of the operation or other procedure, possible alternative methods of treatment, the risks involved, and the possibility of complication to me.  I acknowledge that no guarantee has been made as to the result that may be obtained.  3.  I recognize that, during the course of this operation, or other procedure, unforseen conditions may necessitate additional or different procedure than those listed above.  I, therefore, further authorize and request that the above named physician, his/her physician assistants or designees perform such procedures as are, in his/her professional opinion, necessary and desirable.  4.  Any tissue or organs removed in the operation or other procedure may be disposed of by and at the discretion of the [facility] staff and [facility].  5.  I understand that in the event of a medical emergency, I will be transported by local paramedics to [facility] or other hospital emergency department.  6.  I certify that I have read and fully understand the above consent to operation and/or other procedure.    7.  I acknowledge that my physician has explained sedation/analgesia administration to me including the risks and benefits.  I consent to the administration of sedation/analgesia as may be necessary or desirable in the judgement of my physician.    Witness signature: ___________________________________________________ Date:  ______/______/_____                    Time:  ________ A.M.  P.M.       Patient Name:   Edi Villatoro  4/22/1975  [id number]       Patient signature:  ___________________________________________________    Statement of Physician  My  signature below affirms that prior to the time of the procedure, I have explained to the patient and/or his/her guardian, the risks and benefits involved in the proposed treatment and any reasonable alternative to the proposed treatment.  I have also explained the risks and benefits involved in the refusal of the proposed treatment and have answered the patient's questions.                        Date:  ______/______/_______  Provider                      Signature:  __________________________________________________________       Time:  ___________ A.M    P.M.

## (undated) NOTE — LETTER
Date: 2024      Patient Name: Edi Villatoro      : 1975        Thank you for choosing [facility] as your health care provider. Your physician has deemed the following medical service(s) necessary. However, your insurance plan may not pay for all of your health care and costs and may deny payment for this service. The fact that your insurance plan does not pay for an item or service does not mean you should not receive it. The purpose of this form is to help you make an informed decision about whether or not you want to receive this service(s) that may not be paid for by your insurance plan.    CPT Code Description     Cost     Bilateral knee injections  $2400.00      I understand that the above mentioned service(s) or supply may not be covered by my insurance company. I agree to be financially responsible for the cost of this service or supply in the event of my insurance denies payment as a non-covered benefit.        ______________________________________________________________________  Signature of Patient or Patient's Representative  Relationship  Date    ______________________________________________________________________  Signature of Witness to signing of form   Printed Name

## (undated) NOTE — LETTER
Date: 2025      Patient Name: Edi Villatoro      : 1975        Thank you for choosing [facility] as your health care provider. Your physician has deemed the following medical service(s) necessary. However, your insurance plan may not pay for all of your health care and costs and may deny payment for this service. The fact that your insurance plan does not pay for an item or service does not mean you should not receive it. The purpose of this form is to help you make an informed decision about whether or not you want to receive this service(s) that may not be paid for by your insurance plan.    CPT Code Description     Cost     BILATERAL knee HA and CSI under US       I understand that the above mentioned service(s) or supply may not be covered by my insurance company. I agree to be financially responsible for the cost of this service or supply in the event of my insurance denies payment as a non-covered benefit.        ______________________________________________________________________  Signature of Patient or Patient's Representative  Relationship  Date    ______________________________________________________________________  Signature of Witness to signing of form   Printed Name